# Patient Record
Sex: FEMALE | Race: BLACK OR AFRICAN AMERICAN | NOT HISPANIC OR LATINO | Employment: UNEMPLOYED | URBAN - METROPOLITAN AREA
[De-identification: names, ages, dates, MRNs, and addresses within clinical notes are randomized per-mention and may not be internally consistent; named-entity substitution may affect disease eponyms.]

---

## 2020-08-05 ENCOUNTER — PROCEDURE VISIT (OUTPATIENT)
Dept: SLEEP MEDICINE | Facility: HOSPITAL | Age: 40
End: 2020-08-05
Attending: INTERNAL MEDICINE
Payer: MEDICAID

## 2020-08-05 DIAGNOSIS — J45.40 MODERATE PERSISTENT ASTHMA: ICD-10-CM

## 2020-08-05 DIAGNOSIS — R53.83 FATIGUE: ICD-10-CM

## 2020-08-05 DIAGNOSIS — R06.02 SHORTNESS OF BREATH: ICD-10-CM

## 2020-08-05 DIAGNOSIS — J45.40 MODERATE PERSISTENT ASTHMA: Primary | ICD-10-CM

## 2020-08-05 DIAGNOSIS — E66.8 OTHER OBESITY: ICD-10-CM

## 2020-08-05 PROCEDURE — 95810 POLYSOM 6/> YRS 4/> PARAM: CPT

## 2020-08-07 DIAGNOSIS — J45.40 MODERATE PERSISTENT ASTHMA: Primary | ICD-10-CM

## 2020-08-07 DIAGNOSIS — E66.8 OTHER OBESITY: ICD-10-CM

## 2020-08-07 DIAGNOSIS — R53.83 FATIGUE: ICD-10-CM

## 2020-08-07 DIAGNOSIS — R06.02 SHORTNESS OF BREATH: ICD-10-CM

## 2020-09-10 ENCOUNTER — PROCEDURE VISIT (OUTPATIENT)
Dept: SLEEP MEDICINE | Facility: HOSPITAL | Age: 40
End: 2020-09-10
Attending: INTERNAL MEDICINE
Payer: MEDICAID

## 2020-09-10 DIAGNOSIS — R06.02 SHORTNESS OF BREATH: ICD-10-CM

## 2020-09-10 DIAGNOSIS — R53.83 FATIGUE: ICD-10-CM

## 2020-09-10 DIAGNOSIS — J45.40 MODERATE PERSISTENT ASTHMA: ICD-10-CM

## 2020-09-10 DIAGNOSIS — E66.8 OTHER OBESITY: ICD-10-CM

## 2020-09-10 PROCEDURE — 95811 POLYSOM 6/>YRS CPAP 4/> PARM: CPT

## 2021-07-11 ENCOUNTER — HOSPITAL ENCOUNTER (OUTPATIENT)
Facility: HOSPITAL | Age: 41
LOS: 1 days | Discharge: HOME | End: 2021-07-11
Attending: EMERGENCY MEDICINE | Admitting: INTERNAL MEDICINE
Payer: MEDICAID

## 2021-07-11 VITALS
WEIGHT: 293 LBS | RESPIRATION RATE: 18 BRPM | BODY MASS INDEX: 41.02 KG/M2 | OXYGEN SATURATION: 97 % | DIASTOLIC BLOOD PRESSURE: 88 MMHG | HEART RATE: 70 BPM | HEIGHT: 71 IN | TEMPERATURE: 98 F | SYSTOLIC BLOOD PRESSURE: 153 MMHG

## 2021-07-11 DIAGNOSIS — I21.4 NSTEMI (NON-ST ELEVATED MYOCARDIAL INFARCTION): ICD-10-CM

## 2021-07-11 DIAGNOSIS — F17.210 TOBACCO SMOKER, 1 PACK OF CIGARETTES OR LESS PER DAY: ICD-10-CM

## 2021-07-11 DIAGNOSIS — R07.9 CHEST PAIN: ICD-10-CM

## 2021-07-11 DIAGNOSIS — G47.33 OSA (OBSTRUCTIVE SLEEP APNEA): ICD-10-CM

## 2021-07-11 DIAGNOSIS — E66.01 SEVERE OBESITY (BMI >= 40): ICD-10-CM

## 2021-07-11 DIAGNOSIS — I10 HTN (HYPERTENSION): ICD-10-CM

## 2021-07-11 DIAGNOSIS — R79.89 ELEVATED TROPONIN: Primary | ICD-10-CM

## 2021-07-11 DIAGNOSIS — J45.909 ASTHMA: ICD-10-CM

## 2021-07-11 DIAGNOSIS — I10 ESSENTIAL HYPERTENSION: ICD-10-CM

## 2021-07-11 DIAGNOSIS — I24.9 ACUTE CORONARY SYNDROME: ICD-10-CM

## 2021-07-11 DIAGNOSIS — R07.89 ANTERIOR CHEST WALL PAIN: ICD-10-CM

## 2021-07-11 LAB
ALBUMIN SERPL BCP-MCNC: 3.7 G/DL (ref 3.5–5)
ALBUMIN/GLOB SERPL: 0.9 {RATIO}
ALP SERPL-CCNC: 58 U/L (ref 37–98)
ALT SERPL W P-5'-P-CCNC: 25 U/L (ref 13–56)
ANION GAP SERPL CALCULATED.3IONS-SCNC: 12 MMOL/L (ref 7–16)
AORTIC ROOT ANNULUS: 2.5 CM
AORTIC VALVE CUSP SEPERATION: 2.06 CM
APTT PPP: 157.5 SECONDS (ref 25.2–37.3)
APTT PPP: 29.3 SECONDS (ref 25.2–37.3)
AST SERPL W P-5'-P-CCNC: 17 U/L (ref 15–37)
AV INDEX (PROSTH): 0.84
AV MEAN GRADIENT: 5 MMHG
AV PEAK GRADIENT: 8 MMHG
AV VALVE AREA: 2.91 CM2
AV VELOCITY RATIO: 0.71
BASOPHILS # BLD AUTO: 0.02 K/UL (ref 0–0.2)
BASOPHILS NFR BLD AUTO: 0.2 % (ref 0–1)
BILIRUB SERPL-MCNC: 0.5 MG/DL (ref 0–1.2)
BSA FOR ECHO PROCEDURE: 2.85 M2
BUN SERPL-MCNC: 9 MG/DL (ref 7–18)
BUN/CREAT SERPL: 10 (ref 6–20)
CALCIUM SERPL-MCNC: 9.1 MG/DL (ref 8.5–10.1)
CHLORIDE SERPL-SCNC: 107 MMOL/L (ref 98–107)
CO2 SERPL-SCNC: 28 MMOL/L (ref 21–32)
CREAT SERPL-MCNC: 0.93 MG/DL (ref 0.55–1.02)
CV ECHO LV RWT: 0.55 CM
D DIMER PPP FEU-MCNC: 0.34 ΜG/ML (ref 0–0.47)
DIFFERENTIAL METHOD BLD: ABNORMAL
DOP CALC AO PEAK VEL: 1.4 M/S
DOP CALC AO VTI: 25 CM
DOP CALC LVOT AREA: 3.5 CM2
DOP CALC LVOT DIAMETER: 2.1 CM
DOP CALC LVOT PEAK VEL: 1 M/S
DOP CALC LVOT STROKE VOLUME: 72.7 CM3
DOP CALCLVOT PEAK VEL VTI: 21 CM
E WAVE DECELERATION TIME: 204 MSEC
ECHO EF ESTIMATED: 65 %
ECHO LV POSTERIOR WALL: 1.44 CM (ref 0.6–1.1)
EJECTION FRACTION: 65 %
EOSINOPHIL # BLD AUTO: 0.07 K/UL (ref 0–0.5)
EOSINOPHIL NFR BLD AUTO: 0.8 % (ref 1–4)
ERYTHROCYTE [DISTWIDTH] IN BLOOD BY AUTOMATED COUNT: 19.3 % (ref 11.5–14.5)
EST. AVERAGE GLUCOSE BLD GHB EST-MCNC: 107 MG/DL
FRACTIONAL SHORTENING: 40 % (ref 28–44)
GLOBULIN SER-MCNC: 3.9 G/DL (ref 2–4)
GLUCOSE SERPL-MCNC: 119 MG/DL (ref 74–106)
HBA1C MFR BLD HPLC: 5.8 % (ref 4.5–6.6)
HCG SERUM QUALITATIVE: NEGATIVE
HCT VFR BLD AUTO: 37 % (ref 38–47)
HGB BLD-MCNC: 10.9 G/DL (ref 12–16)
IMM GRANULOCYTES # BLD AUTO: 0.03 K/UL (ref 0–0.04)
IMM GRANULOCYTES NFR BLD: 0.4 % (ref 0–0.4)
INR BLD: 0.91 (ref 0.9–1.1)
INTERVENTRICULAR SEPTUM: 1.48 CM (ref 0.6–1.1)
IVC OSTIUM: 2.1 CM
LEFT ATRIUM SIZE: 3.5 CM
LEFT INTERNAL DIMENSION IN SYSTOLE: 3.15 CM (ref 2.1–4)
LEFT VENTRICLE MASS INDEX: 124 G/M2
LEFT VENTRICULAR INTERNAL DIMENSION IN DIASTOLE: 5.25 CM (ref 3.5–6)
LEFT VENTRICULAR MASS: 333.93 G
LVOT MG: 2 MMHG
LYMPHOCYTES # BLD AUTO: 1.93 K/UL (ref 1–4.8)
LYMPHOCYTES NFR BLD AUTO: 23.4 % (ref 27–41)
MCH RBC QN AUTO: 22.6 PG (ref 27–31)
MCHC RBC AUTO-ENTMCNC: 29.5 G/DL (ref 32–36)
MCV RBC AUTO: 76.6 FL (ref 80–96)
MONOCYTES # BLD AUTO: 0.58 K/UL (ref 0–0.8)
MONOCYTES NFR BLD AUTO: 7 % (ref 2–6)
MPC BLD CALC-MCNC: 11.2 FL (ref 9.4–12.4)
MV PEAK E VEL: 0.92 M/S
NEUTROPHILS # BLD AUTO: 5.61 K/UL (ref 1.8–7.7)
NEUTROPHILS NFR BLD AUTO: 68.2 % (ref 53–65)
NRBC # BLD AUTO: 0 X10E3/UL
NRBC, AUTO (.00): 0 %
NT-PROBNP SERPL-MCNC: 30 PG/ML (ref 1–125)
OVALOCYTES BLD QL SMEAR: ABNORMAL
PISA TR MAX VEL: 2.6 M/S
PLATELET # BLD AUTO: 297 K/UL (ref 150–400)
PLATELET MORPHOLOGY: ABNORMAL
POLYCHROMASIA BLD QL SMEAR: ABNORMAL
POTASSIUM SERPL-SCNC: 4 MMOL/L (ref 3.5–5.1)
PROT SERPL-MCNC: 7.6 G/DL (ref 6.4–8.2)
PROTHROMBIN TIME: 12.3 SECONDS (ref 11.7–14.7)
RA MAJOR: 4.5 CM
RA PRESSURE: 3 MMHG
RBC # BLD AUTO: 4.83 M/UL (ref 4.2–5.4)
RIGHT VENTRICULAR END-DIASTOLIC DIMENSION: 3.6 CM
SODIUM SERPL-SCNC: 143 MMOL/L (ref 136–145)
TR MAX PG: 27 MMHG
TRICUSPID ANNULAR PLANE SYSTOLIC EXCURSION: 2.7 CM
TROPONIN I SERPL-MCNC: 0.06 NG/ML
TROPONIN I SERPL-MCNC: 0.11 NG/ML
TROPONIN I SERPL-MCNC: 0.21 NG/ML
TV REST PULMONARY ARTERY PRESSURE: 30 MMHG
WBC # BLD AUTO: 8.24 K/UL (ref 4.5–11)

## 2021-07-11 PROCEDURE — 99285 EMERGENCY DEPT VISIT HI MDM: CPT | Mod: ,,, | Performed by: EMERGENCY MEDICINE

## 2021-07-11 PROCEDURE — 85730 THROMBOPLASTIN TIME PARTIAL: CPT | Performed by: EMERGENCY MEDICINE

## 2021-07-11 PROCEDURE — 93005 ELECTROCARDIOGRAM TRACING: CPT | Mod: 59

## 2021-07-11 PROCEDURE — 63600175 PHARM REV CODE 636 W HCPCS: Performed by: INTERNAL MEDICINE

## 2021-07-11 PROCEDURE — 25000003 PHARM REV CODE 250: Performed by: INTERNAL MEDICINE

## 2021-07-11 PROCEDURE — 25000242 PHARM REV CODE 250 ALT 637 W/ HCPCS: Performed by: HOSPITALIST

## 2021-07-11 PROCEDURE — 99285 PR EMERGENCY DEPT VISIT,LEVEL V: ICD-10-PCS | Mod: ,,, | Performed by: EMERGENCY MEDICINE

## 2021-07-11 PROCEDURE — 96376 TX/PRO/DX INJ SAME DRUG ADON: CPT | Mod: 59 | Performed by: EMERGENCY MEDICINE

## 2021-07-11 PROCEDURE — 99285 EMERGENCY DEPT VISIT HI MDM: CPT | Mod: 25 | Performed by: EMERGENCY MEDICINE

## 2021-07-11 PROCEDURE — 25000003 PHARM REV CODE 250: Performed by: EMERGENCY MEDICINE

## 2021-07-11 PROCEDURE — 85025 COMPLETE CBC W/AUTO DIFF WBC: CPT | Performed by: EMERGENCY MEDICINE

## 2021-07-11 PROCEDURE — 93454 CORONARY ARTERY ANGIO S&I: CPT | Mod: 26,,, | Performed by: INTERNAL MEDICINE

## 2021-07-11 PROCEDURE — 96374 THER/PROPH/DIAG INJ IV PUSH: CPT | Performed by: EMERGENCY MEDICINE

## 2021-07-11 PROCEDURE — C1887 CATHETER, GUIDING: HCPCS | Performed by: INTERNAL MEDICINE

## 2021-07-11 PROCEDURE — 99152 MOD SED SAME PHYS/QHP 5/>YRS: CPT | Performed by: INTERNAL MEDICINE

## 2021-07-11 PROCEDURE — 84703 CHORIONIC GONADOTROPIN ASSAY: CPT | Performed by: EMERGENCY MEDICINE

## 2021-07-11 PROCEDURE — G0378 HOSPITAL OBSERVATION PER HR: HCPCS

## 2021-07-11 PROCEDURE — 63600175 PHARM REV CODE 636 W HCPCS: Performed by: EMERGENCY MEDICINE

## 2021-07-11 PROCEDURE — 63600175 PHARM REV CODE 636 W HCPCS

## 2021-07-11 PROCEDURE — 27100168 OPTIME MED/SURG SUP & DEVICES NON-STERILE SUPPLY: Performed by: INTERNAL MEDICINE

## 2021-07-11 PROCEDURE — 83880 ASSAY OF NATRIURETIC PEPTIDE: CPT | Performed by: INTERNAL MEDICINE

## 2021-07-11 PROCEDURE — 80053 COMPREHEN METABOLIC PANEL: CPT | Performed by: EMERGENCY MEDICINE

## 2021-07-11 PROCEDURE — 25500020 PHARM REV CODE 255: Performed by: INTERNAL MEDICINE

## 2021-07-11 PROCEDURE — 99204 PR OFFICE/OUTPT VISIT, NEW, LEVL IV, 45-59 MIN: ICD-10-PCS | Mod: 25,,, | Performed by: INTERNAL MEDICINE

## 2021-07-11 PROCEDURE — 36415 COLL VENOUS BLD VENIPUNCTURE: CPT | Performed by: INTERNAL MEDICINE

## 2021-07-11 PROCEDURE — C1769 GUIDE WIRE: HCPCS | Performed by: INTERNAL MEDICINE

## 2021-07-11 PROCEDURE — 25000242 PHARM REV CODE 250 ALT 637 W/ HCPCS: Performed by: EMERGENCY MEDICINE

## 2021-07-11 PROCEDURE — C1894 INTRO/SHEATH, NON-LASER: HCPCS | Performed by: INTERNAL MEDICINE

## 2021-07-11 PROCEDURE — 93010 ELECTROCARDIOGRAM REPORT: CPT | Mod: 59,,, | Performed by: INTERNAL MEDICINE

## 2021-07-11 PROCEDURE — 27800903 OPTIME MED/SURG SUP & DEVICES OTHER IMPLANTS: Performed by: INTERNAL MEDICINE

## 2021-07-11 PROCEDURE — 84484 ASSAY OF TROPONIN QUANT: CPT | Performed by: EMERGENCY MEDICINE

## 2021-07-11 PROCEDURE — 36415 COLL VENOUS BLD VENIPUNCTURE: CPT | Performed by: EMERGENCY MEDICINE

## 2021-07-11 PROCEDURE — 83036 HEMOGLOBIN GLYCOSYLATED A1C: CPT | Performed by: INTERNAL MEDICINE

## 2021-07-11 PROCEDURE — 27201423 OPTIME MED/SURG SUP & DEVICES STERILE SUPPLY: Performed by: INTERNAL MEDICINE

## 2021-07-11 PROCEDURE — 93010 ELECTROCARDIOGRAM REPORT: CPT | Mod: 76,59,, | Performed by: INTERNAL MEDICINE

## 2021-07-11 PROCEDURE — 85378 FIBRIN DEGRADE SEMIQUANT: CPT | Performed by: EMERGENCY MEDICINE

## 2021-07-11 PROCEDURE — 85610 PROTHROMBIN TIME: CPT | Performed by: EMERGENCY MEDICINE

## 2021-07-11 PROCEDURE — 93010 EKG 12-LEAD: ICD-10-PCS | Mod: 59,,, | Performed by: INTERNAL MEDICINE

## 2021-07-11 PROCEDURE — 99220 PR INITIAL OBSERVATION CARE,LEVL III: ICD-10-PCS | Mod: GC,,, | Performed by: INTERNAL MEDICINE

## 2021-07-11 PROCEDURE — 99220 PR INITIAL OBSERVATION CARE,LEVL III: CPT | Mod: GC,,, | Performed by: INTERNAL MEDICINE

## 2021-07-11 PROCEDURE — 99204 OFFICE O/P NEW MOD 45 MIN: CPT | Mod: 25,,, | Performed by: INTERNAL MEDICINE

## 2021-07-11 PROCEDURE — 27000080 OPTIME MED/SURG SUP & DEVICES GENERAL CLASSIFICATION: Performed by: INTERNAL MEDICINE

## 2021-07-11 PROCEDURE — 96375 TX/PRO/DX INJ NEW DRUG ADDON: CPT | Mod: 59 | Performed by: EMERGENCY MEDICINE

## 2021-07-11 PROCEDURE — 94761 N-INVAS EAR/PLS OXIMETRY MLT: CPT

## 2021-07-11 PROCEDURE — 93454 CORONARY ARTERY ANGIO S&I: CPT | Performed by: INTERNAL MEDICINE

## 2021-07-11 PROCEDURE — 84484 ASSAY OF TROPONIN QUANT: CPT | Performed by: INTERNAL MEDICINE

## 2021-07-11 PROCEDURE — 93454 PR CATH PLACE/CORONARY ANGIO, IMG SUPER/INTERP: ICD-10-PCS | Mod: 26,,, | Performed by: INTERNAL MEDICINE

## 2021-07-11 PROCEDURE — 85730 THROMBOPLASTIN TIME PARTIAL: CPT | Performed by: INTERNAL MEDICINE

## 2021-07-11 PROCEDURE — 27000221 HC OXYGEN, UP TO 24 HOURS

## 2021-07-11 RX ORDER — SODIUM CHLORIDE 450 MG/100ML
INJECTION, SOLUTION INTRAVENOUS
Status: DISCONTINUED | OUTPATIENT
Start: 2021-07-11 | End: 2021-07-11

## 2021-07-11 RX ORDER — ORPHENADRINE CITRATE 30 MG/ML
60 INJECTION INTRAMUSCULAR; INTRAVENOUS
Status: COMPLETED | OUTPATIENT
Start: 2021-07-11 | End: 2021-07-11

## 2021-07-11 RX ORDER — AMLODIPINE BESYLATE 5 MG/1
5 TABLET ORAL DAILY
Qty: 30 TABLET | Refills: 11 | Status: SHIPPED | OUTPATIENT
Start: 2021-07-12 | End: 2022-07-12

## 2021-07-11 RX ORDER — LIDOCAINE HYDROCHLORIDE 10 MG/ML
INJECTION INFILTRATION; PERINEURAL
Status: DISCONTINUED | OUTPATIENT
Start: 2021-07-11 | End: 2021-07-11 | Stop reason: HOSPADM

## 2021-07-11 RX ORDER — ONDANSETRON 2 MG/ML
4 INJECTION INTRAMUSCULAR; INTRAVENOUS
Status: COMPLETED | OUTPATIENT
Start: 2021-07-11 | End: 2021-07-11

## 2021-07-11 RX ORDER — METOPROLOL TARTRATE 25 MG/1
12.5 TABLET ORAL 2 TIMES DAILY
Status: DISCONTINUED | OUTPATIENT
Start: 2021-07-11 | End: 2021-07-11

## 2021-07-11 RX ORDER — IPRATROPIUM BROMIDE AND ALBUTEROL SULFATE 2.5; .5 MG/3ML; MG/3ML
3 SOLUTION RESPIRATORY (INHALATION)
Status: COMPLETED | OUTPATIENT
Start: 2021-07-11 | End: 2021-07-11

## 2021-07-11 RX ORDER — ASPIRIN 325 MG
325 TABLET ORAL
Status: COMPLETED | OUTPATIENT
Start: 2021-07-11 | End: 2021-07-11

## 2021-07-11 RX ORDER — CARVEDILOL 6.25 MG/1
6.25 TABLET ORAL 2 TIMES DAILY
Status: DISCONTINUED | OUTPATIENT
Start: 2021-07-11 | End: 2021-07-11

## 2021-07-11 RX ORDER — ASPIRIN 325 MG
325 TABLET ORAL
Status: DISCONTINUED | OUTPATIENT
Start: 2021-07-11 | End: 2021-07-11

## 2021-07-11 RX ORDER — MORPHINE SULFATE 4 MG/ML
4 INJECTION, SOLUTION INTRAMUSCULAR; INTRAVENOUS
Status: COMPLETED | OUTPATIENT
Start: 2021-07-11 | End: 2021-07-11

## 2021-07-11 RX ORDER — ATORVASTATIN CALCIUM 80 MG/1
80 TABLET, FILM COATED ORAL DAILY
Status: DISCONTINUED | OUTPATIENT
Start: 2021-07-11 | End: 2021-07-11

## 2021-07-11 RX ORDER — NITROGLYCERIN 0.4 MG/1
0.4 TABLET SUBLINGUAL EVERY 5 MIN PRN
Status: DISCONTINUED | OUTPATIENT
Start: 2021-07-11 | End: 2021-07-11

## 2021-07-11 RX ORDER — ONDANSETRON 4 MG/1
8 TABLET, ORALLY DISINTEGRATING ORAL EVERY 8 HOURS PRN
Status: DISCONTINUED | OUTPATIENT
Start: 2021-07-11 | End: 2021-07-11 | Stop reason: HOSPADM

## 2021-07-11 RX ORDER — MORPHINE SULFATE 2 MG/ML
2 INJECTION, SOLUTION INTRAMUSCULAR; INTRAVENOUS
Status: COMPLETED | OUTPATIENT
Start: 2021-07-11 | End: 2021-07-11

## 2021-07-11 RX ORDER — HEPARIN SOD,PORCINE/0.9 % NACL 1000/500ML
INTRAVENOUS SOLUTION INTRAVENOUS
Status: DISCONTINUED | OUTPATIENT
Start: 2021-07-11 | End: 2021-07-11 | Stop reason: HOSPADM

## 2021-07-11 RX ORDER — NAPROXEN 500 MG/1
500 TABLET ORAL 2 TIMES DAILY PRN
Qty: 30 TABLET | Refills: 0 | Status: SHIPPED | OUTPATIENT
Start: 2021-07-11

## 2021-07-11 RX ORDER — NAPROXEN 250 MG/1
500 TABLET ORAL 2 TIMES DAILY WITH MEALS
Status: DISCONTINUED | OUTPATIENT
Start: 2021-07-11 | End: 2021-07-11 | Stop reason: HOSPADM

## 2021-07-11 RX ORDER — ACETAMINOPHEN 325 MG/1
650 TABLET ORAL EVERY 4 HOURS PRN
Status: DISCONTINUED | OUTPATIENT
Start: 2021-07-11 | End: 2021-07-11 | Stop reason: HOSPADM

## 2021-07-11 RX ORDER — FENTANYL CITRATE 50 UG/ML
INJECTION, SOLUTION INTRAMUSCULAR; INTRAVENOUS
Status: DISCONTINUED | OUTPATIENT
Start: 2021-07-11 | End: 2021-07-11 | Stop reason: HOSPADM

## 2021-07-11 RX ORDER — ACETAMINOPHEN 325 MG/1
650 TABLET ORAL EVERY 4 HOURS PRN
Status: DISCONTINUED | OUTPATIENT
Start: 2021-07-11 | End: 2021-07-11

## 2021-07-11 RX ORDER — NITROGLYCERIN 0.4 MG/1
0.4 TABLET SUBLINGUAL EVERY 5 MIN PRN
Status: COMPLETED | OUTPATIENT
Start: 2021-07-11 | End: 2021-07-11

## 2021-07-11 RX ORDER — HEPARIN SODIUM,PORCINE/D5W 25000/250
0-40 INTRAVENOUS SOLUTION INTRAVENOUS CONTINUOUS
Status: DISCONTINUED | OUTPATIENT
Start: 2021-07-11 | End: 2021-07-11

## 2021-07-11 RX ORDER — HEPARIN SODIUM 5000 [USP'U]/ML
INJECTION, SOLUTION INTRAVENOUS; SUBCUTANEOUS
Status: COMPLETED
Start: 2021-07-11 | End: 2021-07-11

## 2021-07-11 RX ORDER — ENOXAPARIN SODIUM 100 MG/ML
120 INJECTION SUBCUTANEOUS
Status: DISCONTINUED | OUTPATIENT
Start: 2021-07-11 | End: 2021-07-11

## 2021-07-11 RX ORDER — SODIUM CHLORIDE 0.9 % (FLUSH) 0.9 %
10 SYRINGE (ML) INJECTION
Status: DISCONTINUED | OUTPATIENT
Start: 2021-07-11 | End: 2021-07-11 | Stop reason: HOSPADM

## 2021-07-11 RX ORDER — AMLODIPINE BESYLATE 5 MG/1
5 TABLET ORAL DAILY
Status: DISCONTINUED | OUTPATIENT
Start: 2021-07-11 | End: 2021-07-11 | Stop reason: HOSPADM

## 2021-07-11 RX ORDER — NAPROXEN SODIUM 220 MG/1
81 TABLET, FILM COATED ORAL DAILY
Status: DISCONTINUED | OUTPATIENT
Start: 2021-07-11 | End: 2021-07-11 | Stop reason: HOSPADM

## 2021-07-11 RX ORDER — MIDAZOLAM HYDROCHLORIDE 1 MG/ML
INJECTION INTRAMUSCULAR; INTRAVENOUS
Status: DISCONTINUED | OUTPATIENT
Start: 2021-07-11 | End: 2021-07-11 | Stop reason: HOSPADM

## 2021-07-11 RX ORDER — MORPHINE SULFATE 2 MG/ML
2 INJECTION, SOLUTION INTRAMUSCULAR; INTRAVENOUS EVERY 4 HOURS PRN
Status: DISCONTINUED | OUTPATIENT
Start: 2021-07-11 | End: 2021-07-11

## 2021-07-11 RX ADMIN — NITROGLYCERIN 0.4 MG: 0.4 TABLET SUBLINGUAL at 02:07

## 2021-07-11 RX ADMIN — AMLODIPINE BESYLATE 5 MG: 5 TABLET ORAL at 12:07

## 2021-07-11 RX ADMIN — MORPHINE SULFATE 4 MG: 4 INJECTION INTRAVENOUS at 03:07

## 2021-07-11 RX ADMIN — ORPHENADRINE CITRATE 60 MG: 60 INJECTION INTRAMUSCULAR; INTRAVENOUS at 01:07

## 2021-07-11 RX ADMIN — ASPIRIN 81 MG: 81 TABLET, CHEWABLE ORAL at 09:07

## 2021-07-11 RX ADMIN — ONDANSETRON 4 MG: 2 INJECTION INTRAMUSCULAR; INTRAVENOUS at 01:07

## 2021-07-11 RX ADMIN — ASPIRIN 325 MG ORAL TABLET 325 MG: 325 PILL ORAL at 01:07

## 2021-07-11 RX ADMIN — IPRATROPIUM BROMIDE AND ALBUTEROL SULFATE 3 ML: .5; 3 SOLUTION RESPIRATORY (INHALATION) at 01:07

## 2021-07-11 RX ADMIN — ATORVASTATIN CALCIUM 80 MG: 80 TABLET, FILM COATED ORAL at 06:07

## 2021-07-11 RX ADMIN — NITROGLYCERIN 0.4 MG: 0.4 TABLET SUBLINGUAL at 09:07

## 2021-07-11 RX ADMIN — HEPARIN SODIUM 4000 UNITS: 5000 INJECTION INTRAVENOUS; SUBCUTANEOUS at 03:07

## 2021-07-11 RX ADMIN — HEPARIN SODIUM 12 UNITS/KG/HR: 10000 INJECTION, SOLUTION INTRAVENOUS at 03:07

## 2021-07-11 RX ADMIN — PERFLUTREN 1.5 ML: 6.52 INJECTION, SUSPENSION INTRAVENOUS at 06:07

## 2021-07-11 RX ADMIN — METOPROLOL TARTRATE 12.5 MG: 25 TABLET, FILM COATED ORAL at 04:07

## 2021-07-11 RX ADMIN — MORPHINE SULFATE 4 MG: 4 INJECTION INTRAVENOUS at 01:07

## 2021-07-11 RX ADMIN — MORPHINE SULFATE 2 MG: 2 INJECTION, SOLUTION INTRAMUSCULAR; INTRAVENOUS at 04:07

## 2021-07-19 ENCOUNTER — HOSPITAL ENCOUNTER (EMERGENCY)
Facility: HOSPITAL | Age: 41
Discharge: HOME | End: 2021-07-20
Attending: EMERGENCY MEDICINE
Payer: MEDICAID

## 2021-07-19 DIAGNOSIS — R07.9 CHEST PAIN: ICD-10-CM

## 2021-07-19 DIAGNOSIS — J45.909 ASTHMA: ICD-10-CM

## 2021-07-19 DIAGNOSIS — F17.210 TOBACCO SMOKER, 1 PACK OF CIGARETTES OR LESS PER DAY: ICD-10-CM

## 2021-07-19 DIAGNOSIS — G47.33 OSA (OBSTRUCTIVE SLEEP APNEA): ICD-10-CM

## 2021-07-19 DIAGNOSIS — R79.89 ELEVATED TROPONIN: ICD-10-CM

## 2021-07-19 DIAGNOSIS — R07.89 ANTERIOR CHEST WALL PAIN: ICD-10-CM

## 2021-07-19 DIAGNOSIS — I10 HTN (HYPERTENSION): ICD-10-CM

## 2021-07-19 DIAGNOSIS — R06.02 SHORTNESS OF BREATH: ICD-10-CM

## 2021-07-19 DIAGNOSIS — J45.41 MODERATE PERSISTENT ASTHMA WITH EXACERBATION: Primary | ICD-10-CM

## 2021-07-19 DIAGNOSIS — E66.01 SEVERE OBESITY (BMI >= 40): ICD-10-CM

## 2021-07-19 PROCEDURE — 99283 PR EMERGENCY DEPT VISIT,LEVEL III: ICD-10-PCS | Mod: ,,, | Performed by: EMERGENCY MEDICINE

## 2021-07-19 PROCEDURE — 99283 EMERGENCY DEPT VISIT LOW MDM: CPT | Mod: ,,, | Performed by: EMERGENCY MEDICINE

## 2021-07-19 PROCEDURE — 96365 THER/PROPH/DIAG IV INF INIT: CPT

## 2021-07-19 PROCEDURE — 96374 THER/PROPH/DIAG INJ IV PUSH: CPT | Mod: 59

## 2021-07-19 PROCEDURE — 99285 EMERGENCY DEPT VISIT HI MDM: CPT | Mod: 25

## 2021-07-19 RX ORDER — BUDESONIDE AND FORMOTEROL FUMARATE DIHYDRATE 80; 4.5 UG/1; UG/1
2 AEROSOL RESPIRATORY (INHALATION)
COMMUNITY

## 2021-07-20 VITALS
DIASTOLIC BLOOD PRESSURE: 90 MMHG | BODY MASS INDEX: 41.95 KG/M2 | TEMPERATURE: 100 F | RESPIRATION RATE: 15 BRPM | HEIGHT: 70 IN | HEART RATE: 85 BPM | SYSTOLIC BLOOD PRESSURE: 156 MMHG | WEIGHT: 293 LBS | OXYGEN SATURATION: 97 %

## 2021-07-20 LAB
ALBUMIN SERPL BCP-MCNC: 3.3 G/DL (ref 3.5–5)
ALBUMIN/GLOB SERPL: 1 {RATIO}
ALP SERPL-CCNC: 49 U/L (ref 37–98)
ALT SERPL W P-5'-P-CCNC: 22 U/L (ref 13–56)
ANION GAP SERPL CALCULATED.3IONS-SCNC: 12 MMOL/L (ref 7–16)
ANISOCYTOSIS BLD QL SMEAR: ABNORMAL
AST SERPL W P-5'-P-CCNC: 15 U/L (ref 15–37)
BASOPHILS # BLD AUTO: 0.02 K/UL (ref 0–0.2)
BASOPHILS NFR BLD AUTO: 0.4 % (ref 0–1)
BILIRUB SERPL-MCNC: 0.3 MG/DL (ref 0–1.2)
BUN SERPL-MCNC: 8 MG/DL (ref 7–18)
BUN/CREAT SERPL: 8 (ref 6–20)
CALCIUM SERPL-MCNC: 8.9 MG/DL (ref 8.5–10.1)
CHLORIDE SERPL-SCNC: 109 MMOL/L (ref 98–107)
CO2 SERPL-SCNC: 28 MMOL/L (ref 21–32)
CREAT SERPL-MCNC: 0.98 MG/DL (ref 0.55–1.02)
DIFFERENTIAL METHOD BLD: ABNORMAL
EOSINOPHIL # BLD AUTO: 0.15 K/UL (ref 0–0.5)
EOSINOPHIL NFR BLD AUTO: 2.6 % (ref 1–4)
EOSINOPHIL NFR BLD MANUAL: 3 % (ref 1–4)
ERYTHROCYTE [DISTWIDTH] IN BLOOD BY AUTOMATED COUNT: 19.5 % (ref 11.5–14.5)
GLOBULIN SER-MCNC: 3.4 G/DL (ref 2–4)
GLUCOSE SERPL-MCNC: 132 MG/DL (ref 74–106)
HCT VFR BLD AUTO: 32.7 % (ref 38–47)
HGB BLD-MCNC: 9.7 G/DL (ref 12–16)
HYPOCHROMIA BLD QL SMEAR: ABNORMAL
IMM GRANULOCYTES # BLD AUTO: 0.01 K/UL (ref 0–0.04)
IMM GRANULOCYTES NFR BLD: 0.2 % (ref 0–0.4)
LYMPHOCYTES # BLD AUTO: 1.5 K/UL (ref 1–4.8)
LYMPHOCYTES NFR BLD AUTO: 26.4 % (ref 27–41)
LYMPHOCYTES NFR BLD MANUAL: 23 % (ref 27–41)
MCH RBC QN AUTO: 22.3 PG (ref 27–31)
MCHC RBC AUTO-ENTMCNC: 29.7 G/DL (ref 32–36)
MCV RBC AUTO: 75.2 FL (ref 80–96)
MONOCYTES # BLD AUTO: 0.74 K/UL (ref 0–0.8)
MONOCYTES NFR BLD AUTO: 13 % (ref 2–6)
MONOCYTES NFR BLD MANUAL: 11 % (ref 2–6)
MPC BLD CALC-MCNC: 9.6 FL (ref 9.4–12.4)
NEUTROPHILS # BLD AUTO: 3.27 K/UL (ref 1.8–7.7)
NEUTROPHILS NFR BLD AUTO: 57.4 % (ref 53–65)
NEUTS SEG NFR BLD MANUAL: 63 % (ref 50–62)
NRBC # BLD AUTO: 0 X10E3/UL
NRBC, AUTO (.00): 0 %
NT-PROBNP SERPL-MCNC: 17 PG/ML (ref 1–125)
OVALOCYTES BLD QL SMEAR: ABNORMAL
PLATELET # BLD AUTO: 235 K/UL (ref 150–400)
PLATELET MORPHOLOGY: ABNORMAL
POTASSIUM SERPL-SCNC: 3.7 MMOL/L (ref 3.5–5.1)
PROT SERPL-MCNC: 6.7 G/DL (ref 6.4–8.2)
RBC # BLD AUTO: 4.35 M/UL (ref 4.2–5.4)
SODIUM SERPL-SCNC: 145 MMOL/L (ref 136–145)
TROPONIN I SERPL-MCNC: <0.017 NG/ML
WBC # BLD AUTO: 5.69 K/UL (ref 4.5–11)

## 2021-07-20 PROCEDURE — 84484 ASSAY OF TROPONIN QUANT: CPT | Performed by: EMERGENCY MEDICINE

## 2021-07-20 PROCEDURE — 63600175 PHARM REV CODE 636 W HCPCS: Performed by: EMERGENCY MEDICINE

## 2021-07-20 PROCEDURE — 83880 ASSAY OF NATRIURETIC PEPTIDE: CPT | Performed by: EMERGENCY MEDICINE

## 2021-07-20 PROCEDURE — 85025 COMPLETE CBC W/AUTO DIFF WBC: CPT | Performed by: EMERGENCY MEDICINE

## 2021-07-20 PROCEDURE — 80053 COMPREHEN METABOLIC PANEL: CPT | Performed by: EMERGENCY MEDICINE

## 2021-07-20 PROCEDURE — 25000242 PHARM REV CODE 250 ALT 637 W/ HCPCS: Performed by: EMERGENCY MEDICINE

## 2021-07-20 PROCEDURE — 36415 COLL VENOUS BLD VENIPUNCTURE: CPT | Performed by: EMERGENCY MEDICINE

## 2021-07-20 RX ORDER — PREDNISONE 20 MG/1
40 TABLET ORAL DAILY
Qty: 10 TABLET | Refills: 0 | Status: SHIPPED | OUTPATIENT
Start: 2021-07-20 | End: 2021-07-25

## 2021-07-20 RX ORDER — MAGNESIUM SULFATE HEPTAHYDRATE 40 MG/ML
2 INJECTION, SOLUTION INTRAVENOUS
Status: COMPLETED | OUTPATIENT
Start: 2021-07-20 | End: 2021-07-20

## 2021-07-20 RX ORDER — BUDESONIDE 0.5 MG/2ML
0.5 INHALANT ORAL ONCE
Status: COMPLETED | OUTPATIENT
Start: 2021-07-20 | End: 2021-07-20

## 2021-07-20 RX ORDER — METHYLPREDNISOLONE SOD SUCC 125 MG
125 VIAL (EA) INJECTION
Status: COMPLETED | OUTPATIENT
Start: 2021-07-20 | End: 2021-07-20

## 2021-07-20 RX ORDER — IPRATROPIUM BROMIDE AND ALBUTEROL SULFATE 2.5; .5 MG/3ML; MG/3ML
3 SOLUTION RESPIRATORY (INHALATION)
Status: COMPLETED | OUTPATIENT
Start: 2021-07-20 | End: 2021-07-20

## 2021-07-20 RX ADMIN — MAGNESIUM SULFATE IN WATER 2 G: 40 INJECTION, SOLUTION INTRAVENOUS at 01:07

## 2021-07-20 RX ADMIN — BUDESONIDE 0.5 MG: 0.5 INHALANT RESPIRATORY (INHALATION) at 01:07

## 2021-07-20 RX ADMIN — IPRATROPIUM BROMIDE AND ALBUTEROL SULFATE 3 ML: .5; 3 SOLUTION RESPIRATORY (INHALATION) at 01:07

## 2021-07-20 RX ADMIN — METHYLPREDNISOLONE SODIUM SUCCINATE 125 MG: 125 INJECTION, POWDER, FOR SOLUTION INTRAMUSCULAR; INTRAVENOUS at 01:07

## 2021-12-28 ENCOUNTER — HOSPITAL ENCOUNTER (EMERGENCY)
Facility: HOSPITAL | Age: 41
Discharge: HOME | End: 2021-12-29
Attending: EMERGENCY MEDICINE
Payer: MEDICAID

## 2021-12-28 DIAGNOSIS — J45.909 ASTHMA: ICD-10-CM

## 2021-12-28 DIAGNOSIS — R07.89 ANTERIOR CHEST WALL PAIN: ICD-10-CM

## 2021-12-28 DIAGNOSIS — F17.210 TOBACCO SMOKER, 1 PACK OF CIGARETTES OR LESS PER DAY: ICD-10-CM

## 2021-12-28 DIAGNOSIS — J45.901 EXACERBATION OF ASTHMA, UNSPECIFIED ASTHMA SEVERITY, UNSPECIFIED WHETHER PERSISTENT: Primary | ICD-10-CM

## 2021-12-28 DIAGNOSIS — R79.89 ELEVATED TROPONIN: ICD-10-CM

## 2021-12-28 DIAGNOSIS — I10 HTN (HYPERTENSION): ICD-10-CM

## 2021-12-28 DIAGNOSIS — R06.02 SOB (SHORTNESS OF BREATH): ICD-10-CM

## 2021-12-28 DIAGNOSIS — E66.01 SEVERE OBESITY (BMI >= 40): ICD-10-CM

## 2021-12-28 DIAGNOSIS — R07.9 CHEST PAIN: ICD-10-CM

## 2021-12-28 DIAGNOSIS — G47.33 OSA (OBSTRUCTIVE SLEEP APNEA): ICD-10-CM

## 2021-12-28 DIAGNOSIS — J45.901 EXACERBATION OF ASTHMA: ICD-10-CM

## 2021-12-28 LAB
FLUAV AG UPPER RESP QL IA.RAPID: NEGATIVE
FLUBV AG UPPER RESP QL IA.RAPID: NEGATIVE
SARS-COV+SARS-COV-2 AG RESP QL IA.RAPID: POSITIVE

## 2021-12-28 PROCEDURE — 99283 PR EMERGENCY DEPT VISIT,LEVEL III: ICD-10-PCS | Mod: ,,, | Performed by: REGISTERED NURSE

## 2021-12-28 PROCEDURE — 25000242 PHARM REV CODE 250 ALT 637 W/ HCPCS: Performed by: REGISTERED NURSE

## 2021-12-28 PROCEDURE — 99284 EMERGENCY DEPT VISIT MOD MDM: CPT

## 2021-12-28 PROCEDURE — 99283 EMERGENCY DEPT VISIT LOW MDM: CPT | Mod: ,,, | Performed by: REGISTERED NURSE

## 2021-12-28 PROCEDURE — 87428 SARSCOV & INF VIR A&B AG IA: CPT | Performed by: REGISTERED NURSE

## 2021-12-28 RX ORDER — IPRATROPIUM BROMIDE AND ALBUTEROL SULFATE 2.5; .5 MG/3ML; MG/3ML
3 SOLUTION RESPIRATORY (INHALATION) ONCE
Status: COMPLETED | OUTPATIENT
Start: 2021-12-28 | End: 2021-12-28

## 2021-12-28 RX ORDER — BUDESONIDE 0.5 MG/2ML
0.5 INHALANT ORAL ONCE
Status: COMPLETED | OUTPATIENT
Start: 2021-12-28 | End: 2021-12-29

## 2021-12-28 RX ADMIN — IPRATROPIUM BROMIDE AND ALBUTEROL SULFATE 3 ML: .5; 3 SOLUTION RESPIRATORY (INHALATION) at 11:12

## 2021-12-28 NOTE — Clinical Note
"Padmini Gaspar (Tab) was seen and treated in our emergency department on 12/28/2021.  She may return to work on 01/09/2022.       If you have any questions or concerns, please don't hesitate to call.      Emely Garcia RN    "

## 2021-12-28 NOTE — Clinical Note
"Padmini "Shae Gaspar was seen and treated in our emergency department on 12/28/2021.     COVID-19 is present in our communities across the state. There is limited testing for COVID at this time, so not all patients can be tested. In this situation, your employee meets the following criteria:    Padmini Gaspar has met the criteria for COVID-19 testing based upon symptoms, travel, and/or potential exposure. The test has been completed and is pending results at this time. During this time the employee is not able to work and should be quarantined per the Centers for Disease Control timelines.     If you have any questions or concerns, or if I can be of further assistance, please do not hesitate to contact me.    Sincerely,             Emely Garcia RN"

## 2021-12-28 NOTE — Clinical Note
"Padmini "Shae Gaspar was seen and treated in our emergency department on 12/28/2021.     COVID-19 is present in our communities across the state. There is limited testing for COVID at this time, so not all patients can be tested. In this situation, your employee meets the following criteria:    Padmini Gaspar has met the criteria for COVID-19 testing and has a POSITIVE result. She can return to work once they are asymptomatic for 72 hours without the use of fever reducing medications AND at least ten days from the first positive result.     If you have any questions or concerns, or if I can be of further assistance, please do not hesitate to contact me.    Sincerely,             Emely Garcia RN"

## 2021-12-29 VITALS
RESPIRATION RATE: 17 BRPM | WEIGHT: 293 LBS | TEMPERATURE: 100 F | SYSTOLIC BLOOD PRESSURE: 159 MMHG | DIASTOLIC BLOOD PRESSURE: 99 MMHG | BODY MASS INDEX: 51.94 KG/M2 | OXYGEN SATURATION: 99 % | HEART RATE: 93 BPM

## 2021-12-29 PROBLEM — R06.02 SOB (SHORTNESS OF BREATH): Status: ACTIVE | Noted: 2021-12-29

## 2021-12-29 PROBLEM — J45.901 EXACERBATION OF ASTHMA: Status: ACTIVE | Noted: 2021-12-29

## 2021-12-29 PROCEDURE — 25000003 PHARM REV CODE 250: Performed by: REGISTERED NURSE

## 2021-12-29 PROCEDURE — 25000242 PHARM REV CODE 250 ALT 637 W/ HCPCS: Performed by: REGISTERED NURSE

## 2021-12-29 RX ORDER — ACETAMINOPHEN 500 MG
1000 TABLET ORAL
Status: COMPLETED | OUTPATIENT
Start: 2021-12-29 | End: 2021-12-29

## 2021-12-29 RX ADMIN — ACETAMINOPHEN 1000 MG: 500 TABLET ORAL at 12:12

## 2021-12-29 RX ADMIN — BUDESONIDE 0.5 MG: 0.5 INHALANT RESPIRATORY (INHALATION) at 12:12

## 2021-12-29 NOTE — PROVIDER PROGRESS NOTES - EMERGENCY DEPT.
Encounter Date: 12/28/2021    ED Physician Progress Notes        Physician Note:   Patient in no distress.  VS stable.  No inspiratory or expiratory wheezing noted AP per auscultation.

## 2021-12-29 NOTE — ED PROVIDER NOTES
Encounter Date: 2021  While in triage pt became diaphoretic, with /116, and increased difficulty of breathing.  Placed in CCU and monitors applied.  Pt became less anxious.  BP decreased to 200/114 then 15 minutes later 195/108.  Respirations less labored.     History     Chief Complaint   Patient presents with    Shortness of Breath     40 yo female presents with wheezing and an asthma attack since this morning.  Also reports body aches and subjective fever.    The history is provided by the patient.     Review of patient's allergies indicates:  No Known Allergies  Past Medical History:   Diagnosis Date    Asthma     Hypertension     Sleep apnea      Past Surgical History:   Procedure Laterality Date     SECTION      LEFT HEART CATHETERIZATION Right 2021    Procedure: Left heart cath;  Surgeon: Isaías Darden MD;  Location: [facility] CATH LAB;  Service: Cardiology;  Laterality: Right;     Family History   Problem Relation Age of Onset    Diabetes Mother     Heart disease Mother     Diabetes Father      Social History     Tobacco Use    Smoking status: Light Tobacco Smoker   Substance Use Topics    Drug use: Not Currently     Review of Systems   Constitutional: Positive for diaphoresis and fever (subjective).   HENT: Negative.    Eyes: Negative.    Respiratory: Positive for cough, shortness of breath and wheezing.    Cardiovascular: Negative.    Gastrointestinal: Negative.    Endocrine: Negative.    Genitourinary: Negative.    Musculoskeletal: Positive for myalgias.   Allergic/Immunologic: Negative.    Neurological: Positive for numbness (around mouth and fingertips).   Hematological: Negative.    Psychiatric/Behavioral: Negative.        Physical Exam     Initial Vitals [212]   BP Pulse Resp Temp SpO2   (!) 194/99 96 18 100 °F (37.8 °C) 97 %      MAP       --         Physical Exam    Constitutional: She appears well-developed and well-nourished.   HENT:   Head:  Normocephalic.   Right Ear: External ear normal.   Left Ear: External ear normal.   Nose: Nose normal.   Mouth/Throat: Oropharynx is clear and moist.   Eyes: Conjunctivae and EOM are normal. Pupils are equal, round, and reactive to light. Right eye exhibits no discharge. Left eye exhibits no discharge. No scleral icterus.   Neck:   Normal range of motion.  Cardiovascular: Normal rate, regular rhythm and normal heart sounds.   Pulmonary/Chest: She has wheezes (expiratory posterior lobes). She exhibits tenderness (to palpation).   Abdominal: Abdomen is soft.   Musculoskeletal:         General: No edema. Normal range of motion.      Cervical back: Normal range of motion.     Lymphadenopathy:     She has no cervical adenopathy.   Neurological: She is alert and oriented to person, place, and time. She has normal strength.   Skin: Skin is warm and dry. Capillary refill takes less than 2 seconds. No rash noted. No erythema.   Psychiatric: Judgment and thought content normal.         Medical Screening Exam   See Full Note    ED Course   Procedures  Labs Reviewed   SARS-COV2 (COVID) W/ FLU ANTIGEN - Abnormal; Notable for the following components:       Result Value    COVID-19 Ag Positive (*)     All other components within normal limits    Narrative:     Positive SARS-CoV antigen results indicate the presence of viral antigens; correlation with patient history and presence of clinical signs & symptoms consistent with COVID-19 are necessary to determine infection status.          Imaging Results          X-Ray Chest AP Portable (In process)                  Medications   albuterol-ipratropium 2.5 mg-0.5 mg/3 mL nebulizer solution 3 mL (3 mLs Nebulization Given 12/28/21 2331)   budesonide nebulizer solution 0.5 mg (0.5 mg Nebulization Given 12/29/21 0017)   acetaminophen tablet 1,000 mg (1,000 mg Oral Given 12/29/21 0027)                       Clinical Impression:   Final diagnoses:  [R06.02] SOB (shortness of  breath)  [J45.901] Exacerbation of asthma, unspecified asthma severity, unspecified whether persistent (Primary)          ED Disposition Condition    Discharge Stable        ED Prescriptions     None        Follow-up Information     Follow up With Specialties Details Why Contact [facility] - Emergency Department Emergency Medicine  As needed.  Locate a provider of choice in the [place] area for continued care. [address] [place] [id number] [phone]           ADAM Oviedo  12/29/21 9476

## 2023-02-01 ENCOUNTER — HOSPITAL ENCOUNTER (OUTPATIENT)
Dept: RADIOLOGY | Facility: HOSPITAL | Age: 43
Discharge: HOME | End: 2023-02-01
Attending: NURSE PRACTITIONER
Payer: MEDICAID

## 2023-02-01 DIAGNOSIS — M25.561 RIGHT KNEE PAIN: ICD-10-CM

## 2023-02-01 DIAGNOSIS — M25.562 LEFT KNEE PAIN: ICD-10-CM

## 2023-02-01 DIAGNOSIS — R22.41 LOCALIZED SWELLING, MASS AND LUMP, LOWER LIMB, RIGHT: ICD-10-CM

## 2023-02-01 PROCEDURE — 73560 X-RAY EXAM OF KNEE 1 OR 2: CPT | Mod: 26,RT,, | Performed by: RADIOLOGY

## 2023-02-01 PROCEDURE — 73560 XR KNEE 1 OR 2 VIEW RIGHT: ICD-10-PCS | Mod: 26,RT,, | Performed by: RADIOLOGY

## 2023-02-01 PROCEDURE — 93970 US LOWER EXTREMITY VEINS BILATERAL: ICD-10-PCS | Mod: 26,,, | Performed by: RADIOLOGY

## 2023-02-01 PROCEDURE — 73560 X-RAY EXAM OF KNEE 1 OR 2: CPT | Mod: TC,LT

## 2023-02-01 PROCEDURE — 73560 X-RAY EXAM OF KNEE 1 OR 2: CPT | Mod: 26,LT,, | Performed by: RADIOLOGY

## 2023-02-01 PROCEDURE — 73560 XR KNEE 1 OR 2 VIEW LEFT: ICD-10-PCS | Mod: 26,LT,, | Performed by: RADIOLOGY

## 2023-02-01 PROCEDURE — 73560 X-RAY EXAM OF KNEE 1 OR 2: CPT | Mod: TC,RT

## 2023-02-01 PROCEDURE — 93970 EXTREMITY STUDY: CPT | Mod: 26,,, | Performed by: RADIOLOGY

## 2023-02-01 PROCEDURE — 93970 EXTREMITY STUDY: CPT | Mod: TC

## 2023-09-27 ENCOUNTER — HOSPITAL ENCOUNTER (OUTPATIENT)
Dept: RADIOLOGY | Facility: HOSPITAL | Age: 43
Discharge: HOME | End: 2023-09-27
Attending: ANESTHESIOLOGY
Payer: MEDICAID

## 2023-09-27 DIAGNOSIS — M54.50 LUMBAGO: ICD-10-CM

## 2023-09-27 PROCEDURE — 72100 X-RAY EXAM L-S SPINE 2/3 VWS: CPT | Mod: 26,,, | Performed by: RADIOLOGY

## 2023-09-27 PROCEDURE — 72100 X-RAY EXAM L-S SPINE 2/3 VWS: CPT | Mod: TC

## 2023-09-27 PROCEDURE — 72100 XR LUMBAR SPINE AP AND LATERAL: ICD-10-PCS | Mod: 26,,, | Performed by: RADIOLOGY

## 2024-04-19 ENCOUNTER — HOSPITAL ENCOUNTER (INPATIENT)
Facility: HOSPITAL | Age: 44
LOS: 3 days | Discharge: HOME | End: 2024-04-22
Attending: FAMILY MEDICINE | Admitting: INTERNAL MEDICINE
Payer: MEDICAID

## 2024-04-19 DIAGNOSIS — R00.2 PALPITATIONS: ICD-10-CM

## 2024-04-19 DIAGNOSIS — R51.9 ACUTE INTRACTABLE HEADACHE, UNSPECIFIED HEADACHE TYPE: ICD-10-CM

## 2024-04-19 DIAGNOSIS — I34.0 MODERATE MITRAL REGURGITATION BY PRIOR ECHOCARDIOGRAM: ICD-10-CM

## 2024-04-19 DIAGNOSIS — R07.2 PRECORDIAL PAIN: ICD-10-CM

## 2024-04-19 DIAGNOSIS — E66.01 MORBID OBESITY WITH BMI OF 50.0-59.9, ADULT: ICD-10-CM

## 2024-04-19 DIAGNOSIS — I50.33 ACUTE ON CHRONIC DIASTOLIC (CONGESTIVE) HEART FAILURE: ICD-10-CM

## 2024-04-19 DIAGNOSIS — I10 ESSENTIAL (PRIMARY) HYPERTENSION: ICD-10-CM

## 2024-04-19 DIAGNOSIS — I50.9 ACUTE CONGESTIVE HEART FAILURE, UNSPECIFIED HEART FAILURE TYPE: Primary | ICD-10-CM

## 2024-04-19 DIAGNOSIS — I50.9 ACUTE CHF: ICD-10-CM

## 2024-04-19 DIAGNOSIS — I24.89 DEMAND ISCHEMIA OF MYOCARDIUM: ICD-10-CM

## 2024-04-19 DIAGNOSIS — J45.909 ASTHMA, UNSPECIFIED ASTHMA SEVERITY, UNSPECIFIED WHETHER COMPLICATED, UNSPECIFIED WHETHER PERSISTENT: ICD-10-CM

## 2024-04-19 DIAGNOSIS — R07.9 CHEST PAIN: ICD-10-CM

## 2024-04-19 LAB
ALBUMIN SERPL BCP-MCNC: 3.1 G/DL (ref 3.5–5)
ALBUMIN/GLOB SERPL: 0.8 {RATIO}
ALP SERPL-CCNC: 43 U/L (ref 39–100)
ALT SERPL W P-5'-P-CCNC: 17 U/L (ref 13–56)
AMPHET UR QL SCN: NEGATIVE
ANION GAP SERPL CALCULATED.3IONS-SCNC: 10 MMOL/L (ref 7–16)
AST SERPL W P-5'-P-CCNC: 11 U/L (ref 15–37)
BARBITURATES UR QL SCN: NEGATIVE
BASOPHILS # BLD AUTO: 0.02 K/UL (ref 0–0.2)
BASOPHILS NFR BLD AUTO: 0.3 % (ref 0–1)
BENZODIAZ METAB UR QL SCN: NEGATIVE
BILIRUB SERPL-MCNC: 0.5 MG/DL (ref ?–1.2)
BUN SERPL-MCNC: 17 MG/DL (ref 7–18)
BUN/CREAT SERPL: 14 (ref 6–20)
CALCIUM SERPL-MCNC: 8.5 MG/DL (ref 8.5–10.1)
CANNABINOIDS UR QL SCN: NEGATIVE
CHLORIDE SERPL-SCNC: 107 MMOL/L (ref 98–107)
CO2 SERPL-SCNC: 30 MMOL/L (ref 21–32)
COCAINE UR QL SCN: NEGATIVE
CREAT SERPL-MCNC: 1.19 MG/DL (ref 0.55–1.02)
D DIMER PPP FEU-MCNC: 0.41 ΜG/ML (ref 0–0.47)
DIFFERENTIAL METHOD BLD: ABNORMAL
EGFR (NO RACE VARIABLE) (RUSH/TITUS): 58 ML/MIN/1.73M2
EOSINOPHIL # BLD AUTO: 0.13 K/UL (ref 0–0.5)
EOSINOPHIL NFR BLD AUTO: 1.7 % (ref 1–4)
ERYTHROCYTE [DISTWIDTH] IN BLOOD BY AUTOMATED COUNT: 18.5 % (ref 11.5–14.5)
GLOBULIN SER-MCNC: 3.8 G/DL (ref 2–4)
GLUCOSE SERPL-MCNC: 108 MG/DL (ref 74–106)
HCT VFR BLD AUTO: 36 % (ref 38–47)
HGB BLD-MCNC: 10.6 G/DL (ref 12–16)
IMM GRANULOCYTES # BLD AUTO: 0.02 K/UL (ref 0–0.04)
IMM GRANULOCYTES NFR BLD: 0.3 % (ref 0–0.4)
INR BLD: 1
LYMPHOCYTES # BLD AUTO: 2.02 K/UL (ref 1–4.8)
LYMPHOCYTES NFR BLD AUTO: 26.5 % (ref 27–41)
MAGNESIUM SERPL-MCNC: 2 MG/DL (ref 1.7–2.3)
MCH RBC QN AUTO: 23.5 PG (ref 27–31)
MCHC RBC AUTO-ENTMCNC: 29.4 G/DL (ref 32–36)
MCV RBC AUTO: 79.8 FL (ref 80–96)
MONOCYTES # BLD AUTO: 0.64 K/UL (ref 0–0.8)
MONOCYTES NFR BLD AUTO: 8.4 % (ref 2–6)
MPC BLD CALC-MCNC: 11.7 FL (ref 9.4–12.4)
NEUTROPHILS # BLD AUTO: 4.8 K/UL (ref 1.8–7.7)
NEUTROPHILS NFR BLD AUTO: 62.8 % (ref 53–65)
NRBC # BLD AUTO: 0 X10E3/UL
NRBC, AUTO (.00): 0 %
NT-PROBNP SERPL-MCNC: 75 PG/ML (ref 1–125)
OPIATES UR QL SCN: NEGATIVE
PCP UR QL SCN: NEGATIVE
PLATELET # BLD AUTO: 303 K/UL (ref 150–400)
POTASSIUM SERPL-SCNC: 4.1 MMOL/L (ref 3.5–5.1)
PROT SERPL-MCNC: 6.9 G/DL (ref 6.4–8.2)
PROTHROMBIN TIME: 13.1 SECONDS (ref 11.7–14.7)
RBC # BLD AUTO: 4.51 M/UL (ref 4.2–5.4)
SODIUM SERPL-SCNC: 143 MMOL/L (ref 136–145)
TROPONIN I SERPL DL<=0.01 NG/ML-MCNC: 66.1 PG/ML
TROPONIN I SERPL DL<=0.01 NG/ML-MCNC: 86.6 PG/ML
TSH SERPL DL<=0.005 MIU/L-ACNC: 1.93 UIU/ML (ref 0.36–3.74)
WBC # BLD AUTO: 7.63 K/UL (ref 4.5–11)

## 2024-04-19 PROCEDURE — G0378 HOSPITAL OBSERVATION PER HR: HCPCS

## 2024-04-19 PROCEDURE — 84484 ASSAY OF TROPONIN QUANT: CPT | Performed by: FAMILY MEDICINE

## 2024-04-19 PROCEDURE — 11000001 HC ACUTE MED/SURG PRIVATE ROOM

## 2024-04-19 PROCEDURE — 80053 COMPREHEN METABOLIC PANEL: CPT | Performed by: FAMILY MEDICINE

## 2024-04-19 PROCEDURE — 83880 ASSAY OF NATRIURETIC PEPTIDE: CPT | Performed by: FAMILY MEDICINE

## 2024-04-19 PROCEDURE — 25000003 PHARM REV CODE 250: Performed by: EMERGENCY MEDICINE

## 2024-04-19 PROCEDURE — 85379 FIBRIN DEGRADATION QUANT: CPT | Performed by: EMERGENCY MEDICINE

## 2024-04-19 PROCEDURE — 84484 ASSAY OF TROPONIN QUANT: CPT | Mod: 91 | Performed by: EMERGENCY MEDICINE

## 2024-04-19 PROCEDURE — 63600175 PHARM REV CODE 636 W HCPCS: Performed by: HOSPITALIST

## 2024-04-19 PROCEDURE — 99223 1ST HOSP IP/OBS HIGH 75: CPT | Mod: ,,, | Performed by: HOSPITALIST

## 2024-04-19 PROCEDURE — 84443 ASSAY THYROID STIM HORMONE: CPT | Performed by: HOSPITALIST

## 2024-04-19 PROCEDURE — 85025 COMPLETE CBC W/AUTO DIFF WBC: CPT | Performed by: FAMILY MEDICINE

## 2024-04-19 PROCEDURE — 99285 EMERGENCY DEPT VISIT HI MDM: CPT | Mod: 25

## 2024-04-19 PROCEDURE — 96375 TX/PRO/DX INJ NEW DRUG ADDON: CPT

## 2024-04-19 PROCEDURE — 63600175 PHARM REV CODE 636 W HCPCS: Performed by: EMERGENCY MEDICINE

## 2024-04-19 PROCEDURE — 25000003 PHARM REV CODE 250: Performed by: HOSPITALIST

## 2024-04-19 PROCEDURE — 93005 ELECTROCARDIOGRAM TRACING: CPT

## 2024-04-19 PROCEDURE — 96374 THER/PROPH/DIAG INJ IV PUSH: CPT

## 2024-04-19 PROCEDURE — 80307 DRUG TEST PRSMV CHEM ANLYZR: CPT | Performed by: EMERGENCY MEDICINE

## 2024-04-19 PROCEDURE — 96372 THER/PROPH/DIAG INJ SC/IM: CPT | Performed by: HOSPITALIST

## 2024-04-19 PROCEDURE — 93010 ELECTROCARDIOGRAM REPORT: CPT | Mod: ,,, | Performed by: HOSPITALIST

## 2024-04-19 PROCEDURE — 99285 EMERGENCY DEPT VISIT HI MDM: CPT | Mod: ,,, | Performed by: EMERGENCY MEDICINE

## 2024-04-19 PROCEDURE — 83735 ASSAY OF MAGNESIUM: CPT | Performed by: HOSPITALIST

## 2024-04-19 PROCEDURE — 85610 PROTHROMBIN TIME: CPT | Performed by: FAMILY MEDICINE

## 2024-04-19 PROCEDURE — 96361 HYDRATE IV INFUSION ADD-ON: CPT

## 2024-04-19 RX ORDER — HYDROCHLOROTHIAZIDE 12.5 MG/1
12.5 TABLET ORAL DAILY
Status: DISCONTINUED | OUTPATIENT
Start: 2024-04-20 | End: 2024-04-22 | Stop reason: HOSPADM

## 2024-04-19 RX ORDER — ATORVASTATIN CALCIUM 80 MG/1
80 TABLET, FILM COATED ORAL NIGHTLY
Status: DISCONTINUED | OUTPATIENT
Start: 2024-04-19 | End: 2024-04-21

## 2024-04-19 RX ORDER — ONDANSETRON HYDROCHLORIDE 2 MG/ML
4 INJECTION, SOLUTION INTRAVENOUS
Status: COMPLETED | OUTPATIENT
Start: 2024-04-19 | End: 2024-04-19

## 2024-04-19 RX ORDER — OLMESARTAN MEDOXOMIL AND HYDROCHLOROTHIAZIDE 40/25 40; 25 MG/1; MG/1
1 TABLET ORAL DAILY
COMMUNITY
Start: 2024-04-17

## 2024-04-19 RX ORDER — LOSARTAN POTASSIUM AND HYDROCHLOROTHIAZIDE 12.5; 5 MG/1; MG/1
2 TABLET ORAL DAILY
Status: DISCONTINUED | OUTPATIENT
Start: 2024-04-20 | End: 2024-04-19

## 2024-04-19 RX ORDER — ASPIRIN 325 MG
325 TABLET ORAL
Status: COMPLETED | OUTPATIENT
Start: 2024-04-19 | End: 2024-04-19

## 2024-04-19 RX ORDER — FUROSEMIDE 10 MG/ML
40 INJECTION INTRAMUSCULAR; INTRAVENOUS EVERY 12 HOURS
Status: DISCONTINUED | OUTPATIENT
Start: 2024-04-20 | End: 2024-04-22 | Stop reason: HOSPADM

## 2024-04-19 RX ORDER — METFORMIN HYDROCHLORIDE 500 MG/1
500 TABLET ORAL 2 TIMES DAILY WITH MEALS
COMMUNITY
Start: 2023-11-24

## 2024-04-19 RX ORDER — CARVEDILOL 25 MG/1
25 TABLET ORAL 2 TIMES DAILY
Status: DISCONTINUED | OUTPATIENT
Start: 2024-04-19 | End: 2024-04-19

## 2024-04-19 RX ORDER — CARVEDILOL 25 MG/1
25 TABLET ORAL 2 TIMES DAILY
COMMUNITY
Start: 2024-04-17

## 2024-04-19 RX ORDER — CARVEDILOL 12.5 MG/1
12.5 TABLET ORAL 2 TIMES DAILY
Status: DISCONTINUED | OUTPATIENT
Start: 2024-04-20 | End: 2024-04-22 | Stop reason: HOSPADM

## 2024-04-19 RX ORDER — LEVALBUTEROL INHALATION SOLUTION 1.25 MG/3ML
1.25 SOLUTION RESPIRATORY (INHALATION) EVERY 12 HOURS
Status: DISCONTINUED | OUTPATIENT
Start: 2024-04-20 | End: 2024-04-22 | Stop reason: HOSPADM

## 2024-04-19 RX ORDER — FUROSEMIDE 40 MG/1
40 TABLET ORAL EVERY MORNING
COMMUNITY
Start: 2024-04-17

## 2024-04-19 RX ORDER — FUROSEMIDE 40 MG/1
40 TABLET ORAL EVERY MORNING
Status: DISCONTINUED | OUTPATIENT
Start: 2024-04-20 | End: 2024-04-19

## 2024-04-19 RX ORDER — AMLODIPINE BESYLATE 10 MG/1
10 TABLET ORAL DAILY
COMMUNITY
Start: 2024-04-17

## 2024-04-19 RX ORDER — LOSARTAN POTASSIUM 50 MG/1
50 TABLET ORAL DAILY
Status: DISCONTINUED | OUTPATIENT
Start: 2024-04-20 | End: 2024-04-22 | Stop reason: HOSPADM

## 2024-04-19 RX ORDER — MINOXIDIL 10 MG/1
10 TABLET ORAL NIGHTLY
Status: ON HOLD | COMMUNITY
Start: 2024-04-17 | End: 2024-04-22 | Stop reason: HOSPADM

## 2024-04-19 RX ORDER — FLUTICASONE FUROATE AND VILANTEROL 100; 25 UG/1; UG/1
1 POWDER RESPIRATORY (INHALATION) DAILY
COMMUNITY
Start: 2024-04-17

## 2024-04-19 RX ORDER — AMLODIPINE BESYLATE 10 MG/1
10 TABLET ORAL DAILY
Status: DISCONTINUED | OUTPATIENT
Start: 2024-04-20 | End: 2024-04-20

## 2024-04-19 RX ORDER — ALBUTEROL SULFATE 90 UG/1
2 AEROSOL, METERED RESPIRATORY (INHALATION) EVERY 4 HOURS PRN
COMMUNITY
Start: 2024-04-17

## 2024-04-19 RX ORDER — MORPHINE SULFATE 4 MG/ML
4 INJECTION, SOLUTION INTRAMUSCULAR; INTRAVENOUS
Status: COMPLETED | OUTPATIENT
Start: 2024-04-19 | End: 2024-04-19

## 2024-04-19 RX ORDER — ALBUTEROL SULFATE 0.83 MG/ML
2.5 SOLUTION RESPIRATORY (INHALATION) EVERY 4 HOURS PRN
COMMUNITY
Start: 2024-04-17

## 2024-04-19 RX ORDER — NITROGLYCERIN 0.4 MG/1
0.4 TABLET SUBLINGUAL EVERY 5 MIN PRN
Status: DISCONTINUED | OUTPATIENT
Start: 2024-04-19 | End: 2024-04-22 | Stop reason: HOSPADM

## 2024-04-19 RX ORDER — BUDESONIDE 0.5 MG/2ML
0.5 INHALANT ORAL EVERY 12 HOURS
Status: DISCONTINUED | OUTPATIENT
Start: 2024-04-20 | End: 2024-04-22 | Stop reason: HOSPADM

## 2024-04-19 RX ADMIN — MORPHINE SULFATE 4 MG: 4 INJECTION INTRAVENOUS at 08:04

## 2024-04-19 RX ADMIN — TICAGRELOR 180 MG: 90 TABLET ORAL at 09:04

## 2024-04-19 RX ADMIN — ASPIRIN 325 MG ORAL TABLET 325 MG: 325 PILL ORAL at 07:04

## 2024-04-19 RX ADMIN — CARVEDILOL 25 MG: 25 TABLET, FILM COATED ORAL at 10:04

## 2024-04-19 RX ADMIN — ENOXAPARIN SODIUM 170 MG: 60 INJECTION SUBCUTANEOUS at 09:04

## 2024-04-19 RX ADMIN — ONDANSETRON 4 MG: 2 INJECTION INTRAMUSCULAR; INTRAVENOUS at 08:04

## 2024-04-19 RX ADMIN — ATORVASTATIN CALCIUM 80 MG: 80 TABLET, FILM COATED ORAL at 09:04

## 2024-04-19 RX ADMIN — SODIUM CHLORIDE 1000 ML: 9 INJECTION, SOLUTION INTRAVENOUS at 07:04

## 2024-04-19 NOTE — ED TRIAGE NOTES
Pt presents to ED via [facility] with c/o chest pain that started last night. Pt reports going to [facility] yesterday and having BP med, fluid pill, and inhaler changed. Pt reports receiving a breathing treatment at the clinic for c/o an asthma flare up. Pt states hx of asthma, HTN, and rates chest pain 8/10.

## 2024-04-19 NOTE — ED PROVIDER NOTES
Encounter Date: 2024    SCRIBE #1 NOTE: I, Caroline Leblanc, am scribing for, and in the presence of,  Naseem Bautista DO. I have scribed the entire note.       History     Chief Complaint   Patient presents with    Chest Pain     The pt is a 42 y/o female coming into the ED via EMS with complaints of CP that was onset last night. The pt states she went to a clinic yesterday for a breathing treatment due to an asthma flare up. The pt states before the CP started, she had a HA. She also states she is SOB. The pt states she has never had this happen before. She also reports feeling lightheaded and fatigued. The pt denies dysuria, nausea, vomiting, or diarrhea but states she has leg swelling. She states having a family Hx of heart disease and diabetes. She states she has a Hx of HTN and diabetes. There are no other complaints at this time.    The history is provided by the patient. No  was used.     Review of patient's allergies indicates:  No Known Allergies  Past Medical History:   Diagnosis Date    Asthma     Essential (primary) hypertension     Sleep apnea      Past Surgical History:   Procedure Laterality Date     SECTION      LEFT HEART CATHETERIZATION Right 2021    Procedure: Left heart cath;  Surgeon: Isaías Darden MD;  Location: [facility] CATH LAB;  Service: Cardiology;  Laterality: Right;     Family History   Problem Relation Name Age of Onset    Diabetes Mother      Heart disease Mother      Diabetes Father       Social History     Tobacco Use    Smoking status: Light Smoker   Substance Use Topics    Drug use: Not Currently     Review of Systems   Constitutional:  Positive for fatigue.   Respiratory:  Positive for shortness of breath.    Cardiovascular:  Positive for chest pain and leg swelling.   Gastrointestinal:  Negative for diarrhea, nausea and vomiting.   Genitourinary:  Negative for dysuria.   Neurological:  Positive for light-headedness and headaches.   All  other systems reviewed and are negative.      Physical Exam     Initial Vitals [04/19/24 1559]   BP Pulse Resp Temp SpO2   126/60 82 (!) 26 97.8 °F (36.6 °C) 99 %      MAP       --         Physical Exam    Nursing note and vitals reviewed.  Constitutional: She appears well-developed and well-nourished.   BMI >30   HENT:   Head: Normocephalic and atraumatic.   Right Ear: External ear normal.   Left Ear: External ear normal.   Nose: Nose normal.   Mouth/Throat: Oropharynx is clear and moist.   Eyes: Conjunctivae and EOM are normal. Pupils are equal, round, and reactive to light. No scleral icterus.   Neck: Neck supple. No JVD present.   Normal range of motion.  Cardiovascular:  Normal rate, regular rhythm, normal heart sounds and intact distal pulses.     Exam reveals no gallop and no friction rub.       No murmur heard.  Pulmonary/Chest: Breath sounds normal. No stridor. No respiratory distress. She has no wheezes. She exhibits no tenderness.   Abdominal: Abdomen is soft. Bowel sounds are normal. She exhibits no distension and no mass. There is no abdominal tenderness. There is no rebound and no guarding.   Musculoskeletal:         General: No tenderness or edema. Normal range of motion.      Cervical back: Normal range of motion and neck supple.      Comments: Back is nontender to palpation.      Neurological: She is alert and oriented to person, place, and time. She has normal strength. No cranial nerve deficit.   Skin: Skin is warm and dry. Capillary refill takes less than 2 seconds. No rash noted. No pallor.   Psychiatric: She has a normal mood and affect. Thought content normal.         ED Course   Procedures  Labs Reviewed   COMPREHENSIVE METABOLIC PANEL - Abnormal; Notable for the following components:       Result Value    Glucose 108 (*)     Creatinine 1.19 (*)     Albumin 3.1 (*)     AST 11 (*)     eGFR 58 (*)     All other components within normal limits   TROPONIN I - Abnormal; Notable for the following  components:    Troponin I High Sensitivity 66.1 (*)     All other components within normal limits   CBC WITH DIFFERENTIAL - Abnormal; Notable for the following components:    Hemoglobin 10.6 (*)     Hematocrit 36.0 (*)     MCV 79.8 (*)     MCH 23.5 (*)     MCHC 29.4 (*)     RDW 18.5 (*)     Lymphocytes % 26.5 (*)     Monocytes % 8.4 (*)     All other components within normal limits   TROPONIN I - Abnormal; Notable for the following components:    Troponin I High Sensitivity 86.6 (*)     All other components within normal limits   PROTIME-INR - Normal   NT-PRO NATRIURETIC PEPTIDE - Normal   D DIMER, QUANTITATIVE - Normal   DRUG SCREEN, URINE (BEAKER) - Normal   CBC W/ AUTO DIFFERENTIAL    Narrative:     The following orders were created for panel order CBC auto differential.  Procedure                               Abnormality         Status                     ---------                               -----------         ------                     CBC with Differential[[id number]]       Abnormal            Final result                 Please view results for these tests on the individual orders.        ECG Results              EKG 12-lead (Final result)        Collection Time Result Time QRS Duration OHS QTC Calculation    04/19/24 16:00:34 04/20/24 17:40:50 82 396                     Final result by Interface, Lab In [facility] (04/20/24 17:40:56)                   Narrative:    Test Reason : R07.9,    Vent. Rate : 078 BPM     Atrial Rate : 000 BPM     P-R Int : 148 ms          QRS Dur : 082 ms      QT Int : 364 ms       P-R-T Axes : 071 077 001 degrees     QTc Int : 396 ms    Sinus rhythm  Anterolateral T wave abnormality may be due to myocardial ischemia      Confirmed by Rosas HALE, Sahara SALGADO ([id number]) on 4/20/2024 5:40:48 PM    Referred By: [email or web address]   SELF           Confirmed By:Sahara Pillai MD                                  Imaging Results              X-Ray Chest AP Portable (Final result)  Result time  04/19/24 17:52:38      Final result by Rebel Rodriguez MD (04/19/24 17:52:38)                   Impression:      No definite acute process      Electronically signed by: Rebel Rodriguez  Date:    04/19/2024  Time:    17:52               Narrative:    EXAMINATION:  XR CHEST AP PORTABLE    CLINICAL HISTORY:  .  Chest pain, unspecified.  Shortness of breath.  Lightheadedness    COMPARISON:  December 29, 2021    TECHNIQUE:  Chest x-ray AP portable    FINDINGS:  There is mild cardiomegaly.  Mediastinal contours are unremarkable.  Pulmonary vasculature is upper normal.  Lungs and pleural spaces are clear.  Osseous structures are unchanged                                       Medications   enoxaparin (LOVENOX) injection 170 mg kit (170 mg Subcutaneous Given 4/20/24 0924)   atorvastatin tablet 80 mg (80 mg Oral Given 4/19/24 2127)   nitroGLYCERIN SL tablet 0.4 mg (has no administration in time range)   amLODIPine tablet 10 mg (10 mg Oral Given 4/20/24 0924)   losartan tablet 50 mg (50 mg Oral Given 4/20/24 0923)     And   hydroCHLOROthiazide tablet 12.5 mg (12.5 mg Oral Given 4/20/24 0922)   furosemide injection 40 mg (40 mg Intravenous Given 4/20/24 0537)   levalbuterol nebulizer solution 1.25 mg (1.25 mg Nebulization Given 4/20/24 0735)   budesonide nebulizer solution 0.5 mg (0.5 mg Nebulization Given 4/20/24 0742)   carvediloL tablet 12.5 mg (12.5 mg Oral Given 4/20/24 0922)   acetaminophen tablet 1,000 mg (1,000 mg Oral Given 4/20/24 1134)   bisacodyL EC tablet 10 mg (has no administration in time range)   dextromethorphan-guaiFENesin  mg/5 ml liquid 10 mL (has no administration in time range)   melatonin tablet 6 mg (has no administration in time range)   ondansetron injection 8 mg (has no administration in time range)   simethicone chewable tablet 80 mg (has no administration in time range)   traZODone tablet 50 mg (has no administration in time range)   aspirin tablet 325 mg (325 mg Oral Given 4/19/24  1917)   sodium chloride 0.9% bolus 1,000 mL 1,000 mL (0 mLs Intravenous Stopped 4/19/24 2036)   morphine injection 4 mg (4 mg Intravenous Given 4/19/24 2005)   ondansetron injection 4 mg (4 mg Intravenous Given 4/19/24 2004)   ticagrelor tablet 180 mg (180 mg Oral Given 4/19/24 2127)   hydrOXYzine pamoate capsule 25 mg (25 mg Oral Given 4/20/24 0028)     Medical Decision Making  MDM    Patient presents for emergent evaluation of acute chest tightness shortness breath headache.  that poses a threat to life and/or bodily function.    In the ED patient found to have acute elevated troponin demand ischemia.  Acute CHF.  Balloon hypotension.    I ordered labs and personally reviewed them.  Labs significant for troponin elevated mildly 66-86 on repeat with marginally significant delta troponin  I ordered X-rays and personally reviewed them and reviewed the radiologist interpretation.  Xray significant for mild cardiomegaly..    I ordered EKG and personally reviewed it.  EKG significant for no ST elevation.      Discharge MDM  I discussed the patient presentation and findings with the consultant for hospital medicine (speciality).    Patient was managed in the ED with IV morphine Zofran aspirin.    The response to treatment was improved.    Patient was discharged in stable condition.  Detailed return precautions discussed.     Amount and/or Complexity of Data Reviewed  Labs: ordered.  Radiology: ordered.    Risk  OTC drugs.  Prescription drug management.              Attending Attestation:           Physician Attestation for Scribe:  Physician Attestation Statement for Scribe #1: I, Naseem Bautista, DO, reviewed documentation, as scribed by Caroline Leblanc in my presence, and it is both accurate and complete.             ED Course as of 04/20/24 1825 Fri Apr 19, 2024   1801 Sign out.  Chest pain. Follow up second troponin. [PK]      ED Course User Index  [PK] Randell Perez MD                           Clinical  Impression:  Final diagnoses:  [R07.9] Chest pain  [I24.89] Demand ischemia of myocardium  [I50.33] Acute on chronic diastolic (congestive) heart failure  [I50.9] Acute congestive heart failure, unspecified heart failure type [I50.9] (Primary)  [I10] Essential (primary) hypertension [I10]  [J45.909] Asthma, unspecified asthma severity, unspecified whether complicated, unspecified whether persistent [J45.909]  [E66.01, Z68.43] Morbid obesity with BMI of 50.0-59.9, adult [E66.01, Z68.43]          ED Disposition Condition    Observation                 Randell Perez MD  04/20/24 3744

## 2024-04-20 PROBLEM — R51.9 ACUTE INTRACTABLE HEADACHE: Status: ACTIVE | Noted: 2024-04-20

## 2024-04-20 LAB
AORTIC ROOT ANNULUS: 2.98 CM
AORTIC VALVE CUSP SEPERATION: 2.3 CM
APTT PPP: 34.2 SECONDS (ref 25.2–37.3)
AV INDEX (PROSTH): 0.98
AV MEAN GRADIENT: 6 MMHG
AV PEAK GRADIENT: 11 MMHG
AV VALVE AREA BY VELOCITY RATIO: 2.82 CM²
AV VALVE AREA: 3.17 CM²
AV VELOCITY RATIO: 0.87
BSA FOR ECHO PROCEDURE: 2.97 M2
CHOLEST SERPL-MCNC: 135 MG/DL (ref 0–200)
CHOLEST/HDLC SERPL: 3.8 {RATIO}
CV ECHO LV RWT: 0.88 CM
DOP CALC AO PEAK VEL: 1.65 M/S
DOP CALC AO VTI: 34.8 CM
DOP CALC LVOT AREA: 3.2 CM2
DOP CALC LVOT DIAMETER: 2.03 CM
DOP CALC LVOT PEAK VEL: 1.44 M/S
DOP CALC LVOT STROKE VOLUME: 110.31 CM3
DOP CALCLVOT PEAK VEL VTI: 34.1 CM
E WAVE DECELERATION TIME: 370.84 MSEC
E/A RATIO: 1.11
E/E' RATIO: 13.14 M/S
ECHO LV POSTERIOR WALL: 1.83 CM (ref 0.6–1.1)
EST. AVERAGE GLUCOSE BLD GHB EST-MCNC: 148 MG/DL
FRACTIONAL SHORTENING: 37 % (ref 28–44)
HBA1C MFR BLD HPLC: 6.8 % (ref 4.5–6.6)
HDLC SERPL-MCNC: 36 MG/DL (ref 40–60)
INTERVENTRICULAR SEPTUM: 1.81 CM (ref 0.6–1.1)
LDLC SERPL CALC-MCNC: 79 MG/DL
LDLC/HDLC SERPL: 2.2 {RATIO}
LEFT ATRIUM VOLUME INDEX MOD: 19.6 ML/M2
LEFT ATRIUM VOLUME MOD: 54.91 CM3
LEFT INTERNAL DIMENSION IN SYSTOLE: 2.61 CM (ref 2.1–4)
LEFT VENTRICLE DIASTOLIC VOLUME INDEX: 27.61 ML/M2
LEFT VENTRICLE DIASTOLIC VOLUME: 77.31 ML
LEFT VENTRICLE MASS INDEX: 120 G/M2
LEFT VENTRICLE SYSTOLIC VOLUME INDEX: 8.9 ML/M2
LEFT VENTRICLE SYSTOLIC VOLUME: 24.87 ML
LEFT VENTRICULAR INTERNAL DIMENSION IN DIASTOLE: 4.17 CM (ref 3.5–6)
LEFT VENTRICULAR MASS: 336.62 G
LV LATERAL E/E' RATIO: 11.5 M/S
LV SEPTAL E/E' RATIO: 15.33 M/S
LVOT MG: 4.48 MMHG
LVOT MV: 0.98 CM/S
MV PEAK A VEL: 0.83 M/S
MV PEAK E VEL: 0.92 M/S
MV STENOSIS PRESSURE HALF TIME: 107.54 MS
MV VALVE AREA P 1/2 METHOD: 2.05 CM2
NONHDLC SERPL-MCNC: 99 MG/DL
OHS QRS DURATION: 82 MS
OHS QTC CALCULATION: 396 MS
PISA MRMAX VEL: 2.18 M/S
PISA TR MAX VEL: 1.2 M/S
PV PEAK GRADIENT: 7 MMHG
PV PEAK VELOCITY: 1.34 M/S
RA VOL SYS: 33.27 ML
RIGHT ATRIAL AREA: 14.6 CM2
RIGHT VENTRICULAR LENGTH IN DIASTOLE (APICAL 4-CHAMBER VIEW): 7.23 CM
RV MID DIAMA: 2.33 CM
TDI LATERAL: 0.08 M/S
TDI SEPTAL: 0.06 M/S
TDI: 0.07 M/S
TR MAX PG: 6 MMHG
TRICUSPID ANNULAR PLANE SYSTOLIC EXCURSION: 2.63 CM
TRIGL SERPL-MCNC: 101 MG/DL (ref 35–150)
TROPONIN I SERPL DL<=0.01 NG/ML-MCNC: 59.5 PG/ML
VLDLC SERPL-MCNC: 20 MG/DL
Z-SCORE OF LEFT VENTRICULAR DIMENSION IN END DIASTOLE: -21.87
Z-SCORE OF LEFT VENTRICULAR DIMENSION IN END SYSTOLE: -16.58

## 2024-04-20 PROCEDURE — 11000001 HC ACUTE MED/SURG PRIVATE ROOM

## 2024-04-20 PROCEDURE — 99900031 HC PATIENT EDUCATION (STAT)

## 2024-04-20 PROCEDURE — 84484 ASSAY OF TROPONIN QUANT: CPT | Performed by: HOSPITALIST

## 2024-04-20 PROCEDURE — 80061 LIPID PANEL: CPT | Performed by: HOSPITALIST

## 2024-04-20 PROCEDURE — 99900035 HC TECH TIME PER 15 MIN (STAT)

## 2024-04-20 PROCEDURE — 94640 AIRWAY INHALATION TREATMENT: CPT

## 2024-04-20 PROCEDURE — 27000190 HC CPAP FULL FACE MASK W/VALVE

## 2024-04-20 PROCEDURE — 85730 THROMBOPLASTIN TIME PARTIAL: CPT | Performed by: HOSPITALIST

## 2024-04-20 PROCEDURE — 94761 N-INVAS EAR/PLS OXIMETRY MLT: CPT

## 2024-04-20 PROCEDURE — 99232 SBSQ HOSP IP/OBS MODERATE 35: CPT | Mod: ,,, | Performed by: HOSPITALIST

## 2024-04-20 PROCEDURE — 83036 HEMOGLOBIN GLYCOSYLATED A1C: CPT | Performed by: HOSPITALIST

## 2024-04-20 PROCEDURE — 25000003 PHARM REV CODE 250: Performed by: HOSPITALIST

## 2024-04-20 PROCEDURE — 25000003 PHARM REV CODE 250: Performed by: INTERNAL MEDICINE

## 2024-04-20 PROCEDURE — 63600175 PHARM REV CODE 636 W HCPCS: Performed by: HOSPITALIST

## 2024-04-20 PROCEDURE — 25000242 PHARM REV CODE 250 ALT 637 W/ HCPCS: Performed by: HOSPITALIST

## 2024-04-20 PROCEDURE — 27000221 HC OXYGEN, UP TO 24 HOURS

## 2024-04-20 RX ORDER — METOCLOPRAMIDE HYDROCHLORIDE 5 MG/ML
10 INJECTION INTRAMUSCULAR; INTRAVENOUS EVERY 6 HOURS
Status: DISCONTINUED | OUTPATIENT
Start: 2024-04-20 | End: 2024-04-21

## 2024-04-20 RX ORDER — BISACODYL 5 MG
10 TABLET, DELAYED RELEASE (ENTERIC COATED) ORAL DAILY PRN
Status: DISCONTINUED | OUTPATIENT
Start: 2024-04-20 | End: 2024-04-22 | Stop reason: HOSPADM

## 2024-04-20 RX ORDER — HYDROXYZINE PAMOATE 25 MG/1
25 CAPSULE ORAL ONCE
Status: COMPLETED | OUTPATIENT
Start: 2024-04-20 | End: 2024-04-20

## 2024-04-20 RX ORDER — TALC
6 POWDER (GRAM) TOPICAL NIGHTLY PRN
Status: DISCONTINUED | OUTPATIENT
Start: 2024-04-20 | End: 2024-04-22 | Stop reason: HOSPADM

## 2024-04-20 RX ORDER — ACETAMINOPHEN 500 MG
1000 TABLET ORAL EVERY 6 HOURS PRN
Status: DISCONTINUED | OUTPATIENT
Start: 2024-04-20 | End: 2024-04-22 | Stop reason: HOSPADM

## 2024-04-20 RX ORDER — SIMETHICONE 80 MG
1 TABLET,CHEWABLE ORAL 3 TIMES DAILY PRN
Status: DISCONTINUED | OUTPATIENT
Start: 2024-04-20 | End: 2024-04-22 | Stop reason: HOSPADM

## 2024-04-20 RX ORDER — DIPHENHYDRAMINE HYDROCHLORIDE 50 MG/ML
50 INJECTION INTRAMUSCULAR; INTRAVENOUS EVERY 6 HOURS
Status: DISCONTINUED | OUTPATIENT
Start: 2024-04-20 | End: 2024-04-21

## 2024-04-20 RX ORDER — GUAIFENESIN AND DEXTROMETHORPHAN HYDROBROMIDE 10; 100 MG/5ML; MG/5ML
10 SYRUP ORAL EVERY 6 HOURS PRN
Status: DISCONTINUED | OUTPATIENT
Start: 2024-04-20 | End: 2024-04-22 | Stop reason: HOSPADM

## 2024-04-20 RX ORDER — TRAZODONE HYDROCHLORIDE 50 MG/1
50 TABLET ORAL NIGHTLY PRN
Status: DISCONTINUED | OUTPATIENT
Start: 2024-04-20 | End: 2024-04-22 | Stop reason: HOSPADM

## 2024-04-20 RX ORDER — ONDANSETRON HYDROCHLORIDE 2 MG/ML
8 INJECTION, SOLUTION INTRAVENOUS EVERY 6 HOURS PRN
Status: DISCONTINUED | OUTPATIENT
Start: 2024-04-20 | End: 2024-04-22 | Stop reason: HOSPADM

## 2024-04-20 RX ADMIN — ENOXAPARIN SODIUM 170 MG: 60 INJECTION SUBCUTANEOUS at 09:04

## 2024-04-20 RX ADMIN — LEVALBUTEROL HYDROCHLORIDE 1.25 MG: 1.25 SOLUTION RESPIRATORY (INHALATION) at 07:04

## 2024-04-20 RX ADMIN — METOCLOPRAMIDE 10 MG: 5 INJECTION, SOLUTION INTRAMUSCULAR; INTRAVENOUS at 09:04

## 2024-04-20 RX ADMIN — FUROSEMIDE 40 MG: 10 INJECTION, SOLUTION INTRAVENOUS at 08:04

## 2024-04-20 RX ADMIN — LOSARTAN POTASSIUM 50 MG: 50 TABLET, FILM COATED ORAL at 09:04

## 2024-04-20 RX ADMIN — DIPHENHYDRAMINE HYDROCHLORIDE 50 MG: 50 INJECTION INTRAMUSCULAR; INTRAVENOUS at 09:04

## 2024-04-20 RX ADMIN — HYDROXYZINE PAMOATE 25 MG: 25 CAPSULE ORAL at 12:04

## 2024-04-20 RX ADMIN — BUDESONIDE INHALATION 0.5 MG: 0.5 SUSPENSION RESPIRATORY (INHALATION) at 07:04

## 2024-04-20 RX ADMIN — ACETAMINOPHEN 1000 MG: 500 TABLET ORAL at 11:04

## 2024-04-20 RX ADMIN — AMLODIPINE BESYLATE 10 MG: 10 TABLET ORAL at 09:04

## 2024-04-20 RX ADMIN — HYDROCHLOROTHIAZIDE 12.5 MG: 12.5 TABLET ORAL at 09:04

## 2024-04-20 RX ADMIN — ATORVASTATIN CALCIUM 80 MG: 80 TABLET, FILM COATED ORAL at 08:04

## 2024-04-20 RX ADMIN — CARVEDILOL 12.5 MG: 12.5 TABLET, FILM COATED ORAL at 08:04

## 2024-04-20 RX ADMIN — FUROSEMIDE 40 MG: 10 INJECTION, SOLUTION INTRAVENOUS at 05:04

## 2024-04-20 RX ADMIN — CARVEDILOL 12.5 MG: 12.5 TABLET, FILM COATED ORAL at 09:04

## 2024-04-20 NOTE — PLAN OF CARE
Problem: Adult Inpatient Plan of Care  Goal: Plan of Care Review  Outcome: Ongoing, Not Progressing  Goal: Patient-Specific Goal (Individualized)  Outcome: Ongoing, Not Progressing  Goal: Absence of Hospital-Acquired Illness or Injury  Outcome: Ongoing, Not Progressing  Goal: Optimal Comfort and Wellbeing  Outcome: Ongoing, Not Progressing  Goal: Readiness for Transition of Care  Outcome: Ongoing, Not Progressing     Problem: Bariatric Environmental Safety  Goal: Safety Maintained with Care  Outcome: Ongoing, Not Progressing

## 2024-04-20 NOTE — SUBJECTIVE & OBJECTIVE
Past Medical History:   Diagnosis Date    Asthma     Essential (primary) hypertension     Sleep apnea        Past Surgical History:   Procedure Laterality Date     SECTION      LEFT HEART CATHETERIZATION Right 2021    Procedure: Left heart cath;  Surgeon: Isaías Darden MD;  Location: [facility] CATH LAB;  Service: Cardiology;  Laterality: Right;       Review of patient's allergies indicates:  No Known Allergies    Current Facility-Administered Medications   Medication Dose Route Frequency Provider Last Rate Last Admin    [START ON 2024] amLODIPine tablet 10 mg  10 mg Oral Daily Sahara Pillai MD        atorvastatin tablet 80 mg  80 mg Oral QHS Sahara Pillai MD   80 mg at 24    [START ON 2024] budesonide nebulizer solution 0.5 mg  0.5 mg Nebulization Q12H Sahara Pillai MD        [START ON 2024] carvediloL tablet 12.5 mg  12.5 mg Oral BID Sahara Pillai MD        enoxaparin (LOVENOX) injection 170 mg kit  170 mg Subcutaneous Q12H (prophylaxis, 0900/) Sahara Pillai MD   170 mg at 24    [START ON 2024] furosemide injection 40 mg  40 mg Intravenous Q12H Sahara Pillai MD        [START ON 2024] losartan tablet 50 mg  50 mg Oral Daily Naldo Francois MD        And    [START ON 2024] hydroCHLOROthiazide tablet 12.5 mg  12.5 mg Oral Daily Naldo Francois MD        [START ON 2024] levalbuterol nebulizer solution 1.25 mg  1.25 mg Nebulization Q12H Sahara Pillai MD        nitroGLYCERIN SL tablet 0.4 mg  0.4 mg Sublingual Q5 Min PRN Sahara Pillai MD         Family History       Problem Relation (Age of Onset)    Diabetes Mother, Father    Heart disease Mother          Tobacco Use    Smoking status: Light Smoker    Smokeless tobacco: Not on file   Substance and Sexual Activity    Alcohol use: Not on file    Drug use: Not Currently    Sexual activity: Not on file     Review of Systems    Constitutional:  Negative for appetite change, chills, fatigue, fever and unexpected weight change.   HENT:  Negative for congestion, mouth sores, nosebleeds, sinus pain, sore throat and trouble swallowing.    Eyes:  Negative for visual disturbance.   Respiratory:  Positive for shortness of breath and wheezing. Negative for apnea, cough and chest tightness.    Cardiovascular:  Positive for chest pain and leg swelling. Negative for palpitations.   Gastrointestinal:  Negative for abdominal pain, blood in stool, constipation, diarrhea, nausea and vomiting.   Endocrine: Negative for polyuria.   Genitourinary:  Negative for decreased urine volume, difficulty urinating, dysuria and frequency.   Musculoskeletal:  Negative for arthralgias, back pain and neck pain.   Skin:  Negative for rash.   Neurological:  Positive for headaches. Negative for syncope and light-headedness.   Hematological:  Does not bruise/bleed easily.   Psychiatric/Behavioral:  Negative for confusion and suicidal ideas.      Objective:     Vital Signs (Most Recent):  Temp: 97.5 °F (36.4 °C) (04/19/24 2233)  Pulse: 84 (04/19/24 2233)  Resp: 20 (04/19/24 2233)  BP: (!) 158/82 (04/19/24 2233)  SpO2: 98 % (04/19/24 2233) Vital Signs (24h Range):  Temp:  [97.5 °F (36.4 °C)-97.8 °F (36.6 °C)] 97.5 °F (36.4 °C)  Pulse:  [69-89] 84  Resp:  [14-26] 20  SpO2:  [93 %-100 %] 98 %  BP: (122-158)/(60-84) 158/82     Weight: (!) 176.4 kg (389 lb)  Body mass index is 54.25 kg/m².     Physical Exam  Vitals and nursing note reviewed. Exam conducted with a chaperone present.   Constitutional:       General: She is not in acute distress.     Appearance: She is obese. She is ill-appearing. She is not toxic-appearing.   HENT:      Head: Atraumatic.      Mouth/Throat:      Mouth: Mucous membranes are moist.      Pharynx: Oropharynx is clear.   Eyes:      Conjunctiva/sclera: Conjunctivae normal.      Pupils: Pupils are equal, round, and reactive to light.   Neck:       Vascular: No carotid bruit.   Cardiovascular:      Rate and Rhythm: Normal rate and regular rhythm.      Pulses: Normal pulses.      Heart sounds: Normal heart sounds.   Pulmonary:      Effort: Pulmonary effort is normal.      Breath sounds: Wheezing and rales present. No rhonchi.   Chest:      Chest wall: No tenderness.   Abdominal:      General: Abdomen is flat. Bowel sounds are normal.      Palpations: Abdomen is soft.   Musculoskeletal:         General: No deformity or signs of injury. Normal range of motion.      Cervical back: Neck supple.      Right lower leg: Edema present.      Left lower leg: Edema present.   Skin:     General: Skin is warm and dry.      Capillary Refill: Capillary refill takes less than 2 seconds.      Coloration: Skin is not jaundiced or pale.      Findings: No bruising, lesion or rash.   Neurological:      General: No focal deficit present.      Mental Status: She is alert and oriented to person, place, and time.   Psychiatric:         Mood and Affect: Mood normal.              CRANIAL NERVES     CN III, IV, VI   Pupils are equal, round, and reactive to light.       Significant Labs: All pertinent labs within the past 24 hours have been reviewed.    Significant Imaging: I have reviewed all pertinent imaging results/findings within the past 24 hours.

## 2024-04-20 NOTE — ASSESSMENT & PLAN NOTE
"Patient is identified as having  unknown  heart failure that is Acute. CHF is currently uncontrolled due to Continued edema of extremities, Dyspnea not returned to baseline after 40 mg doses of IV diuretic, >3 pillow orthopnea, and Rales/crackles on pulmonary exam. Latest ECHO performed and demonstrates- Results for orders placed during the hospital encounter of 07/11/21    Echo    Interpretation Summary  · The left ventricle is normal in size with moderate concentric hypertrophy and normal systolic function.  · The estimated ejection fraction is 65%.  · Normal left ventricular diastolic function.  · Mild right ventricular enlargement with normal right ventricular systolic function.  · Mild tricuspid regurgitation.  · Normal central venous pressure (3 mmHg).  · The estimated PA systolic pressure is 30 mmHg.  . Continue Beta Blocker, ACE/ARB, Furosemide, and Nitrate/Vasodilator and monitor clinical status closely. Monitor on telemetry. Patient is on CHF pathway.  Monitor strict Is&Os and daily weights.  Place on fluid restriction of 2 L. Cardiology has been consulted. Continue to stress to patient importance of self efficacy and  on diet for CHF. Last BNP reviewed- and noted below No results for input(s): "BNP", "BNPTRIAGEBLO" in the last 168 hours.    Patient does not use oxygen at home and oxygen saturations are 82% on room air transferring from stretcher to bed.  Patient with ongoing condition that if not treated properly could result in loss of life or bodily function.    "

## 2024-04-20 NOTE — ASSESSMENT & PLAN NOTE
Chronic, controlled. Latest blood pressure and vitals reviewed-     Temp:  [97.5 °F (36.4 °C)-97.8 °F (36.6 °C)]   Pulse:  [69-89]   Resp:  [14-26]   BP: (122-158)/(60-84)   SpO2:  [93 %-100 %] .   Home meds for hypertension were reviewed and noted below.   Hypertension Medications               amLODIPine (NORVASC) 10 MG tablet Take 10 mg by mouth once daily.    carvediloL (COREG) 25 MG tablet Take 25 mg by mouth 2 (two) times daily.    furosemide (LASIX) 40 MG tablet Take 40 mg by mouth every morning.    minoxidiL (LONITEN) 10 MG Tab Take 10 mg by mouth every evening.    olmesartan-hydrochlorothiazide (BENICAR HCT) 40-25 mg per tablet Take 1 tablet by mouth once daily.            While in the hospital, will manage blood pressure as follows; Adjust home antihypertensive regimen as follows- decrease BB in half due to acute CHF.  Stop minoxidil    Will utilize p.r.n. blood pressure medication only if patient's blood pressure greater than 140/90 and she develops symptoms such as worsening chest pain or shortness of breath.

## 2024-04-20 NOTE — ASSESSMENT & PLAN NOTE
Body mass index is 54.25 kg/m². Morbid obesity complicates all aspects of disease management from diagnostic modalities to treatment. Weight loss encouraged and health benefits explained to patient.

## 2024-04-20 NOTE — H&P
[facility] Hospital Medicine  History & Physical    Patient Name: Padmini Gaspar  MRN: [id number]  Patient Class: IP- Inpatient  Admission Date: 2024  Attending Physician: Naldo Francois MD   Primary Care Provider: Mildred Castillo NP         Patient information was obtained from patient, past medical records, and ER records.     Subjective:     Principal Problem:Acute CHF    Chief Complaint:   Chief Complaint   Patient presents with    Chest Pain        HPI: 42 yo F presents to [facility] ED with worsening CP and dyspnea.  She has BLE and obvious orthopnea.  She says the CP hurts when she takes deep breath and she does have asthma and has been using her prn inhaler.  She says what began as DE SOUZA is now orthopnea where she has to sit up to breath.  She does use a CPAP at night on setting 10 and this was diagnosed in  when Dr. Darden performed LHC and echo showing no diastolic or systolic function but her RVSP was 30 mmHg at that time showing mild PHTN and norvasc started.      Patient is compliant with meds and has been taking lasix daily.  She says that last week she got a throbbing HA and thought she may be having a migraine but has no h/o migraines.  Has improved with lowering of her BP and she feels better at this time.  She was giving IV lasix in the ED and has had significant UOP.   Not sure if improved BP really has helped with the HA but should be followed.  May be tension from poor sleep but idiopathic intracranial hypertension is always in the differential.  Hopefull will improve with diuresis.      Please see assessment and plan below for detailed problem bases evaluation.      Past Medical History:   Diagnosis Date    Asthma     Essential (primary) hypertension     Sleep apnea        Past Surgical History:   Procedure Laterality Date     SECTION      LEFT HEART CATHETERIZATION Right 2021    Procedure: Left heart cath;  Surgeon: Isaías Darden MD;   Location: [facility] CATH LAB;  Service: Cardiology;  Laterality: Right;       Review of patient's allergies indicates:  No Known Allergies    Current Facility-Administered Medications   Medication Dose Route Frequency Provider Last Rate Last Admin    [START ON 4/20/2024] amLODIPine tablet 10 mg  10 mg Oral Daily Sahara Pillai MD        atorvastatin tablet 80 mg  80 mg Oral QHS Sahara Pillai MD   80 mg at 04/19/24 2127    [START ON 4/20/2024] budesonide nebulizer solution 0.5 mg  0.5 mg Nebulization Q12H Sahara Pillai MD        [START ON 4/20/2024] carvediloL tablet 12.5 mg  12.5 mg Oral BID Sahara Pillai MD        enoxaparin (LOVENOX) injection 170 mg kit  170 mg Subcutaneous Q12H (prophylaxis, 0900/2100) Sahara Pillai MD   170 mg at 04/19/24 2127    [START ON 4/20/2024] furosemide injection 40 mg  40 mg Intravenous Q12H Sahara Pillai MD        [START ON 4/20/2024] losartan tablet 50 mg  50 mg Oral Daily Naldo Francois MD        And    [START ON 4/20/2024] hydroCHLOROthiazide tablet 12.5 mg  12.5 mg Oral Daily Naldo Francois MD        [START ON 4/20/2024] levalbuterol nebulizer solution 1.25 mg  1.25 mg Nebulization Q12H Sahara Pillai MD        nitroGLYCERIN SL tablet 0.4 mg  0.4 mg Sublingual Q5 Min PRN Sahara Pillai MD         Family History       Problem Relation (Age of Onset)    Diabetes Mother, Father    Heart disease Mother          Tobacco Use    Smoking status: Light Smoker    Smokeless tobacco: Not on file   Substance and Sexual Activity    Alcohol use: Not on file    Drug use: Not Currently    Sexual activity: Not on file     Review of Systems   Constitutional:  Negative for appetite change, chills, fatigue, fever and unexpected weight change.   HENT:  Negative for congestion, mouth sores, nosebleeds, sinus pain, sore throat and trouble swallowing.    Eyes:  Negative for visual disturbance.   Respiratory:  Positive for shortness of breath  and wheezing. Negative for apnea, cough and chest tightness.    Cardiovascular:  Positive for chest pain and leg swelling. Negative for palpitations.   Gastrointestinal:  Negative for abdominal pain, blood in stool, constipation, diarrhea, nausea and vomiting.   Endocrine: Negative for polyuria.   Genitourinary:  Negative for decreased urine volume, difficulty urinating, dysuria and frequency.   Musculoskeletal:  Negative for arthralgias, back pain and neck pain.   Skin:  Negative for rash.   Neurological:  Positive for headaches. Negative for syncope and light-headedness.   Hematological:  Does not bruise/bleed easily.   Psychiatric/Behavioral:  Negative for confusion and suicidal ideas.      Objective:     Vital Signs (Most Recent):  Temp: 97.5 °F (36.4 °C) (04/19/24 2233)  Pulse: 84 (04/19/24 2233)  Resp: 20 (04/19/24 2233)  BP: (!) 158/82 (04/19/24 2233)  SpO2: 98 % (04/19/24 2233) Vital Signs (24h Range):  Temp:  [97.5 °F (36.4 °C)-97.8 °F (36.6 °C)] 97.5 °F (36.4 °C)  Pulse:  [69-89] 84  Resp:  [14-26] 20  SpO2:  [93 %-100 %] 98 %  BP: (122-158)/(60-84) 158/82     Weight: (!) 176.4 kg (389 lb)  Body mass index is 54.25 kg/m².     Physical Exam  Vitals and nursing note reviewed. Exam conducted with a chaperone present.   Constitutional:       General: She is not in acute distress.     Appearance: She is obese. She is ill-appearing. She is not toxic-appearing.   HENT:      Head: Atraumatic.      Mouth/Throat:      Mouth: Mucous membranes are moist.      Pharynx: Oropharynx is clear.   Eyes:      Conjunctiva/sclera: Conjunctivae normal.      Pupils: Pupils are equal, round, and reactive to light.   Neck:      Vascular: No carotid bruit.   Cardiovascular:      Rate and Rhythm: Normal rate and regular rhythm.      Pulses: Normal pulses.      Heart sounds: Normal heart sounds.   Pulmonary:      Effort: Pulmonary effort is normal.      Breath sounds: Wheezing and rales present. No rhonchi.   Chest:      Chest wall:  No tenderness.   Abdominal:      General: Abdomen is flat. Bowel sounds are normal.      Palpations: Abdomen is soft.   Musculoskeletal:         General: No deformity or signs of injury. Normal range of motion.      Cervical back: Neck supple.      Right lower leg: Edema present.      Left lower leg: Edema present.   Skin:     General: Skin is warm and dry.      Capillary Refill: Capillary refill takes less than 2 seconds.      Coloration: Skin is not jaundiced or pale.      Findings: No bruising, lesion or rash.   Neurological:      General: No focal deficit present.      Mental Status: She is alert and oriented to person, place, and time.   Psychiatric:         Mood and Affect: Mood normal.              CRANIAL NERVES     CN III, IV, VI   Pupils are equal, round, and reactive to light.       Significant Labs: All pertinent labs within the past 24 hours have been reviewed.    Significant Imaging: I have reviewed all pertinent imaging results/findings within the past 24 hours.  Assessment/Plan:     * Acute CHF  Patient is identified as having  unknown  heart failure that is Acute. CHF is currently uncontrolled due to Continued edema of extremities, Dyspnea not returned to baseline after 40 mg doses of IV diuretic, >3 pillow orthopnea, and Rales/crackles on pulmonary exam. Latest ECHO performed and demonstrates- Results for orders placed during the hospital encounter of 07/11/21    Echo    Interpretation Summary  · The left ventricle is normal in size with moderate concentric hypertrophy and normal systolic function.  · The estimated ejection fraction is 65%.  · Normal left ventricular diastolic function.  · Mild right ventricular enlargement with normal right ventricular systolic function.  · Mild tricuspid regurgitation.  · Normal central venous pressure (3 mmHg).  · The estimated PA systolic pressure is 30 mmHg.  . Continue Beta Blocker, ACE/ARB, Furosemide, and Nitrate/Vasodilator and monitor clinical status  "closely. Monitor on telemetry. Patient is on CHF pathway.  Monitor strict Is&Os and daily weights.  Place on fluid restriction of 2 L. Cardiology has been consulted. Continue to stress to patient importance of self efficacy and  on diet for CHF. Last BNP reviewed- and noted below No results for input(s): "BNP", "BNPTRIAGEBLO" in the last 168 hours.    Patient does not use oxygen at home and oxygen saturations are 82% on room air transferring from stretcher to bed.  Patient with ongoing condition that if not treated properly could result in loss of life or bodily function.      Demand ischemia of myocardium  Gave full dose lovenox, high dose brillinta and statin load.  Received NTG, lasix and asa in ED.    No significant delta change in trops but will check third trop to evaluate the trend.    May be from pulmonary hypertension.  2D echo pending.          Sleep apnea  Continue home CPAP at 10 cmH2O      Morbid obesity with BMI of 50.0-59.9, adult  Body mass index is 54.25 kg/m². Morbid obesity complicates all aspects of disease management from diagnostic modalities to treatment. Weight loss encouraged and health benefits explained to patient.         Essential (primary) hypertension  Chronic, controlled. Latest blood pressure and vitals reviewed-     Temp:  [97.5 °F (36.4 °C)-97.8 °F (36.6 °C)]   Pulse:  [69-89]   Resp:  [14-26]   BP: (122-158)/(60-84)   SpO2:  [93 %-100 %] .   Home meds for hypertension were reviewed and noted below.   Hypertension Medications               amLODIPine (NORVASC) 10 MG tablet Take 10 mg by mouth once daily.    carvediloL (COREG) 25 MG tablet Take 25 mg by mouth 2 (two) times daily.    furosemide (LASIX) 40 MG tablet Take 40 mg by mouth every morning.    minoxidiL (LONITEN) 10 MG Tab Take 10 mg by mouth every evening.    olmesartan-hydrochlorothiazide (BENICAR HCT) 40-25 mg per tablet Take 1 tablet by mouth once daily.            While in the hospital, will manage blood pressure " as follows; Adjust home antihypertensive regimen as follows- decrease BB in half due to acute CHF.  Stop minoxidil    Will utilize p.r.n. blood pressure medication only if patient's blood pressure greater than 140/90 and she develops symptoms such as worsening chest pain or shortness of breath.    Asthma  Continue bronchodilator but as scheduled dosing and will add inhaled steroid while in hospital.  If wheezing improves may be able to return to baseline prn bronchodilator as OP prescribed.       However, if patient has PHTN on 2D echo Dr. Razia Ford has requested referral to her pulmonary clinic and would follow the asthma as well.  May need PFTs once acute illness resolved for accurate results.          VTE Risk Mitigation (From admission, onward)           Ordered     enoxaparin (LOVENOX) injection 170 mg kit  Every 12 hours         04/19/24 2022                                    Sahara Pillai MD  Department of Hospital Medicine  [facility] - [facility]

## 2024-04-20 NOTE — PHARMACY MED REC
"Admission Medication History     The home medication history was taken by Wendi Patel.    You may go to "Admission" then "Reconcile Home Medications" tabs to review and/or act upon these items.     The home medication list has been updated by the Pharmacy department.   Please read ALL comments highlighted in yellow.   Please address this information as you see fit.    Feel free to contact us if you have any questions or require assistance.    The patient was started on new prescriptions recently for blood pressure, she is not familiar with the names of the medications yet.  She will require counseling on home medications.    The following medications were added:  Amlodipine 10 mg  Carvedilol 25 mg  Furosemide 40 mg  Minoxidil 10 mg  Olmesartan-hctz 40-25 mg  Metformin 500 mg      The medications listed below were removed from the home medication list. Please reorder if appropriate:  Patient reports no longer taking the following medication(s):  Amlodipine 5 mg  Symbicort 80-4.5 mcg inh  Naproxen 500 mg      Medications listed below were obtained from: Patient/family, Analytic software- [employer], and Medical records  Current Facility-Administered Medications   Medication Dose Route Frequency Provider Last Rate Last Admin    atorvastatin tablet 80 mg  80 mg Oral QHS Sahara Pillai MD        enoxaparin (LOVENOX) injection 180 mg kit  1 mg/kg Subcutaneous Q12H (prophylaxis, 0900/2100) Sahara Pillai MD        ticagrelor tablet 180 mg  180 mg Oral Once Sahara Pillai MD         Current Outpatient Medications   Medication Sig Dispense Refill    albuterol (PROVENTIL) 2.5 mg /3 mL (0.083 %) nebulizer solution Take 2.5 mg by nebulization every 4 (four) hours as needed.      albuterol (PROVENTIL/VENTOLIN HFA) 90 mcg/actuation inhaler Inhale 2 puffs into the lungs every 4 (four) hours as needed.      amLODIPine (NORVASC) 10 MG tablet Take 10 mg by mouth once daily.      carvediloL (COREG) 25 MG tablet Take " 25 mg by mouth 2 (two) times daily.      furosemide (LASIX) 40 MG tablet Take 40 mg by mouth every morning.      metFORMIN (GLUCOPHAGE) 500 MG tablet Take 500 mg by mouth 2 (two) times daily with meals.      fluticasone furoate-vilanteroL (BREO) 100-25 mcg/dose diskus inhaler Inhale 1 puff into the lungs once daily.      minoxidiL (LONITEN) 10 MG Tab Take 10 mg by mouth every evening.      olmesartan-hydrochlorothiazide (BENICAR HCT) 40-25 mg per tablet Take 1 tablet by mouth once daily.           Current Outpatient Medications on File Prior to Encounter   Medication Sig Dispense Refill Last Dose    albuterol (PROVENTIL) 2.5 mg /3 mL (0.083 %) nebulizer solution Take 2.5 mg by nebulization every 4 (four) hours as needed.   4/19/2024    albuterol (PROVENTIL/VENTOLIN HFA) 90 mcg/actuation inhaler Inhale 2 puffs into the lungs every 4 (four) hours as needed.   4/19/2024    amLODIPine (NORVASC) 10 MG tablet Take 10 mg by mouth once daily.   4/19/2024    carvediloL (COREG) 25 MG tablet Take 25 mg by mouth 2 (two) times daily.   4/19/2024    furosemide (LASIX) 40 MG tablet Take 40 mg by mouth every morning.   4/19/2024    metFORMIN (GLUCOPHAGE) 500 MG tablet Take 500 mg by mouth 2 (two) times daily with meals.   4/19/2024    fluticasone furoate-vilanteroL (BREO) 100-25 mcg/dose diskus inhaler Inhale 1 puff into the lungs once daily.   Unknown    minoxidiL (LONITEN) 10 MG Tab Take 10 mg by mouth every evening.   Unknown    olmesartan-hydrochlorothiazide (BENICAR HCT) 40-25 mg per tablet Take 1 tablet by mouth once daily.   Unknown    [DISCONTINUED] albuterol sulfate (VENTOLIN HFA INHL) Inhale into the lungs.       [DISCONTINUED] amLODIPine (NORVASC) 5 MG tablet Take 1 tablet (5 mg total) by mouth once daily. 30 tablet 11     [DISCONTINUED] budesonide-formoterol 80-4.5 mcg (SYMBICORT) 80-4.5 mcg/actuation HFAA Inhale 2 puffs into the lungs. Controller       [DISCONTINUED] naproxen (NAPROSYN) 500 MG tablet Take 1 tablet (500  mg total) by mouth 2 (two) times daily as needed (Soreness in chest or other musculoskeletal pain). 30 tablet 0        Potential issues to be addressed PRIOR TO DISCHARGE  Please discuss with the patient barriers to adherence with medication treatment plans  Patient requires education regarding drug therapies     Wendi Patel  EXT [id number]                 .

## 2024-04-20 NOTE — ASSESSMENT & PLAN NOTE
Gave full dose lovenox, high dose brillinta and statin load.  Received NTG, lasix and asa in ED.    No significant delta change in trops but will check third trop to evaluate the trend.    May be from pulmonary hypertension.  2D echo pending.

## 2024-04-20 NOTE — HPI
42 yo F presents to [facility] ED with worsening CP and dyspnea.  She has BLE and obvious orthopnea.  She says the CP hurts when she takes deep breath and she does have asthma and has been using her prn inhaler.  She says what began as DE SOUZA is now orthopnea where she has to sit up to breath.  She does use a CPAP at night on setting 10 and this was diagnosed in 2021 when Dr. Darden performed LHC and echo showing no diastolic or systolic function but her RVSP was 30 mmHg at that time showing mild PHTN and norvasc started.      Patient is compliant with meds and has been taking lasix daily.  She says that last week she got a throbbing HA and thought she may be having a migraine but has no h/o migraines.  Has improved with lowering of her BP and she feels better at this time.  She was giving IV lasix in the ED and has had significant UOP.   Not sure if improved BP really has helped with the HA but should be followed.  May be tension from poor sleep but idiopathic intracranial hypertension is always in the differential.  Hopefull will improve with diuresis.      Please see assessment and plan below for detailed problem bases evaluation.

## 2024-04-20 NOTE — ED NOTES
Pt asked to go the restroom. Pt was given a bed side commode for safety. Pt refused. Pt ambulated to the restroom with no visible signs of distress.

## 2024-04-20 NOTE — ASSESSMENT & PLAN NOTE
Continue bronchodilator but as scheduled dosing and will add inhaled steroid while in hospital.  If wheezing improves may be able to return to baseline prn bronchodilator as OP prescribed.       However, if patient has PHTN on 2D echo Dr. Razia Ford has requested referral to her pulmonary clinic and would follow the asthma as well.  May need PFTs once acute illness resolved for accurate results.

## 2024-04-20 NOTE — ED NOTES
Pt stated headache was coming back. Md notified. New orders given. Call bell within reach. Instructed pt to call for any needs.

## 2024-04-21 PROBLEM — R00.2 PALPITATIONS: Status: ACTIVE | Noted: 2024-04-21

## 2024-04-21 PROBLEM — E11.9 DIABETES MELLITUS WITHOUT COMPLICATION: Status: ACTIVE | Noted: 2024-04-21

## 2024-04-21 PROBLEM — I50.33 ACUTE ON CHRONIC DIASTOLIC (CONGESTIVE) HEART FAILURE: Status: ACTIVE | Noted: 2024-04-21

## 2024-04-21 PROBLEM — R07.9 CHEST PAIN: Status: ACTIVE | Noted: 2024-04-21

## 2024-04-21 LAB
ANION GAP SERPL CALCULATED.3IONS-SCNC: 7 MMOL/L (ref 7–16)
ANISOCYTOSIS BLD QL SMEAR: ABNORMAL
ATYPICAL LYMPHOCYTES: ABNORMAL
BASOPHILS # BLD AUTO: 0.01 K/UL (ref 0–0.2)
BASOPHILS NFR BLD AUTO: 0.2 % (ref 0–1)
BUN SERPL-MCNC: 19 MG/DL (ref 7–18)
BUN/CREAT SERPL: 22 (ref 6–20)
CALCIUM SERPL-MCNC: 8.5 MG/DL (ref 8.5–10.1)
CHLORIDE SERPL-SCNC: 110 MMOL/L (ref 98–107)
CO2 SERPL-SCNC: 31 MMOL/L (ref 21–32)
CREAT SERPL-MCNC: 0.87 MG/DL (ref 0.55–1.02)
DACRYOCYTES BLD QL SMEAR: ABNORMAL
DIFFERENTIAL METHOD BLD: ABNORMAL
EGFR (NO RACE VARIABLE) (RUSH/TITUS): 84 ML/MIN/1.73M2
EOSINOPHIL # BLD AUTO: 0.16 K/UL (ref 0–0.5)
EOSINOPHIL NFR BLD AUTO: 2.6 % (ref 1–4)
EOSINOPHIL NFR BLD MANUAL: 3 % (ref 1–4)
ERYTHROCYTE [DISTWIDTH] IN BLOOD BY AUTOMATED COUNT: 18 % (ref 11.5–14.5)
ERYTHROCYTE [SEDIMENTATION RATE] IN BLOOD BY WESTERGREN METHOD: 13 MM/HR (ref 0–20)
GLUCOSE SERPL-MCNC: 127 MG/DL (ref 74–106)
GLUCOSE SERPL-MCNC: 131 MG/DL (ref 70–105)
GLUCOSE SERPL-MCNC: 155 MG/DL (ref 70–105)
GLUCOSE SERPL-MCNC: 180 MG/DL (ref 70–105)
GLUCOSE SERPL-MCNC: 206 MG/DL (ref 70–105)
HCT VFR BLD AUTO: 32.5 % (ref 38–47)
HGB BLD-MCNC: 9.5 G/DL (ref 12–16)
HYPOCHROMIA BLD QL SMEAR: ABNORMAL
IMM GRANULOCYTES # BLD AUTO: 0.06 K/UL (ref 0–0.04)
IMM GRANULOCYTES NFR BLD: 1 % (ref 0–0.4)
LYMPHOCYTES # BLD AUTO: 1.86 K/UL (ref 1–4.8)
LYMPHOCYTES NFR BLD AUTO: 30.7 % (ref 27–41)
LYMPHOCYTES NFR BLD MANUAL: 29 % (ref 27–41)
MCH RBC QN AUTO: 23.5 PG (ref 27–31)
MCHC RBC AUTO-ENTMCNC: 29.2 G/DL (ref 32–36)
MCV RBC AUTO: 80.2 FL (ref 80–96)
MONOCYTES # BLD AUTO: 0.58 K/UL (ref 0–0.8)
MONOCYTES NFR BLD AUTO: 9.6 % (ref 2–6)
MONOCYTES NFR BLD MANUAL: 7 % (ref 2–6)
MPC BLD CALC-MCNC: 11.4 FL (ref 9.4–12.4)
NEUTROPHILS # BLD AUTO: 3.38 K/UL (ref 1.8–7.7)
NEUTROPHILS NFR BLD AUTO: 55.9 % (ref 53–65)
NEUTS SEG NFR BLD MANUAL: 61 % (ref 50–62)
NRBC # BLD AUTO: 0 X10E3/UL
NRBC BLD MANUAL-RTO: 1 /100 WBC
NRBC, AUTO (.00): 0 %
OVALOCYTES BLD QL SMEAR: ABNORMAL
PLATELET # BLD AUTO: 256 K/UL (ref 150–400)
PLATELET MORPHOLOGY: NORMAL
POLYCHROMASIA BLD QL SMEAR: ABNORMAL
POTASSIUM SERPL-SCNC: 3.8 MMOL/L (ref 3.5–5.1)
RBC # BLD AUTO: 4.05 M/UL (ref 4.2–5.4)
SODIUM SERPL-SCNC: 144 MMOL/L (ref 136–145)
TROPONIN I SERPL DL<=0.01 NG/ML-MCNC: 55.1 PG/ML
WBC # BLD AUTO: 6.05 K/UL (ref 4.5–11)

## 2024-04-21 PROCEDURE — 94640 AIRWAY INHALATION TREATMENT: CPT

## 2024-04-21 PROCEDURE — 85025 COMPLETE CBC W/AUTO DIFF WBC: CPT | Performed by: HOSPITALIST

## 2024-04-21 PROCEDURE — 84484 ASSAY OF TROPONIN QUANT: CPT | Performed by: HOSPITALIST

## 2024-04-21 PROCEDURE — 11000001 HC ACUTE MED/SURG PRIVATE ROOM

## 2024-04-21 PROCEDURE — 82962 GLUCOSE BLOOD TEST: CPT

## 2024-04-21 PROCEDURE — 99900035 HC TECH TIME PER 15 MIN (STAT)

## 2024-04-21 PROCEDURE — 63600175 PHARM REV CODE 636 W HCPCS: Performed by: HOSPITALIST

## 2024-04-21 PROCEDURE — 94660 CPAP INITIATION&MGMT: CPT

## 2024-04-21 PROCEDURE — 80048 BASIC METABOLIC PNL TOTAL CA: CPT | Performed by: HOSPITALIST

## 2024-04-21 PROCEDURE — 25000003 PHARM REV CODE 250: Performed by: INTERNAL MEDICINE

## 2024-04-21 PROCEDURE — 5A09357 ASSISTANCE WITH RESPIRATORY VENTILATION, LESS THAN 24 CONSECUTIVE HOURS, CONTINUOUS POSITIVE AIRWAY PRESSURE: ICD-10-PCS | Performed by: INTERNAL MEDICINE

## 2024-04-21 PROCEDURE — 85651 RBC SED RATE NONAUTOMATED: CPT | Performed by: HOSPITALIST

## 2024-04-21 PROCEDURE — 99232 SBSQ HOSP IP/OBS MODERATE 35: CPT | Mod: ,,, | Performed by: INTERNAL MEDICINE

## 2024-04-21 PROCEDURE — 94761 N-INVAS EAR/PLS OXIMETRY MLT: CPT

## 2024-04-21 PROCEDURE — 99223 1ST HOSP IP/OBS HIGH 75: CPT | Mod: ,,, | Performed by: INTERNAL MEDICINE

## 2024-04-21 PROCEDURE — 27000221 HC OXYGEN, UP TO 24 HOURS

## 2024-04-21 PROCEDURE — 25000242 PHARM REV CODE 250 ALT 637 W/ HCPCS: Performed by: HOSPITALIST

## 2024-04-21 PROCEDURE — 25000003 PHARM REV CODE 250: Performed by: HOSPITALIST

## 2024-04-21 RX ORDER — NAPROXEN SODIUM 220 MG/1
81 TABLET, FILM COATED ORAL DAILY
Status: DISCONTINUED | OUTPATIENT
Start: 2024-04-21 | End: 2024-04-22 | Stop reason: HOSPADM

## 2024-04-21 RX ORDER — GLUCAGON 1 MG
1 KIT INJECTION
Status: DISCONTINUED | OUTPATIENT
Start: 2024-04-21 | End: 2024-04-22 | Stop reason: HOSPADM

## 2024-04-21 RX ORDER — IBUPROFEN 200 MG
24 TABLET ORAL
Status: DISCONTINUED | OUTPATIENT
Start: 2024-04-21 | End: 2024-04-22 | Stop reason: HOSPADM

## 2024-04-21 RX ORDER — INSULIN ASPART 100 [IU]/ML
0-5 INJECTION, SOLUTION INTRAVENOUS; SUBCUTANEOUS
Status: DISCONTINUED | OUTPATIENT
Start: 2024-04-21 | End: 2024-04-22 | Stop reason: HOSPADM

## 2024-04-21 RX ORDER — ATORVASTATIN CALCIUM 40 MG/1
40 TABLET, FILM COATED ORAL NIGHTLY
Status: DISCONTINUED | OUTPATIENT
Start: 2024-04-21 | End: 2024-04-22 | Stop reason: HOSPADM

## 2024-04-21 RX ORDER — DAPAGLIFLOZIN 10 MG/1
10 TABLET, FILM COATED ORAL DAILY
Status: DISCONTINUED | OUTPATIENT
Start: 2024-04-21 | End: 2024-04-22 | Stop reason: HOSPADM

## 2024-04-21 RX ORDER — IBUPROFEN 200 MG
16 TABLET ORAL
Status: DISCONTINUED | OUTPATIENT
Start: 2024-04-21 | End: 2024-04-22 | Stop reason: HOSPADM

## 2024-04-21 RX ADMIN — APIXABAN 2.5 MG: 2.5 TABLET, FILM COATED ORAL at 08:04

## 2024-04-21 RX ADMIN — LEVALBUTEROL HYDROCHLORIDE 1.25 MG: 1.25 SOLUTION RESPIRATORY (INHALATION) at 07:04

## 2024-04-21 RX ADMIN — CARVEDILOL 12.5 MG: 12.5 TABLET, FILM COATED ORAL at 09:04

## 2024-04-21 RX ADMIN — DIPHENHYDRAMINE HYDROCHLORIDE 50 MG: 50 INJECTION INTRAMUSCULAR; INTRAVENOUS at 06:04

## 2024-04-21 RX ADMIN — CARVEDILOL 12.5 MG: 12.5 TABLET, FILM COATED ORAL at 08:04

## 2024-04-21 RX ADMIN — ASPIRIN 81 MG CHEWABLE TABLET 81 MG: 81 TABLET CHEWABLE at 03:04

## 2024-04-21 RX ADMIN — METOCLOPRAMIDE 10 MG: 5 INJECTION, SOLUTION INTRAMUSCULAR; INTRAVENOUS at 06:04

## 2024-04-21 RX ADMIN — BUDESONIDE INHALATION 0.5 MG: 0.5 SUSPENSION RESPIRATORY (INHALATION) at 07:04

## 2024-04-21 RX ADMIN — BISACODYL 10 MG: 5 TABLET, COATED ORAL at 08:04

## 2024-04-21 RX ADMIN — HYDROCHLOROTHIAZIDE 12.5 MG: 12.5 TABLET ORAL at 09:04

## 2024-04-21 RX ADMIN — ATORVASTATIN CALCIUM 40 MG: 40 TABLET, FILM COATED ORAL at 08:04

## 2024-04-21 RX ADMIN — APIXABAN 2.5 MG: 2.5 TABLET, FILM COATED ORAL at 09:04

## 2024-04-21 RX ADMIN — FUROSEMIDE 40 MG: 10 INJECTION, SOLUTION INTRAVENOUS at 09:04

## 2024-04-21 RX ADMIN — LOSARTAN POTASSIUM 50 MG: 50 TABLET, FILM COATED ORAL at 09:04

## 2024-04-21 RX ADMIN — FUROSEMIDE 40 MG: 10 INJECTION, SOLUTION INTRAVENOUS at 08:04

## 2024-04-21 RX ADMIN — ACETAMINOPHEN 1000 MG: 500 TABLET ORAL at 08:04

## 2024-04-21 RX ADMIN — DIPHENHYDRAMINE HYDROCHLORIDE 50 MG: 50 INJECTION INTRAMUSCULAR; INTRAVENOUS at 11:04

## 2024-04-21 RX ADMIN — METOCLOPRAMIDE 10 MG: 5 INJECTION, SOLUTION INTRAMUSCULAR; INTRAVENOUS at 11:04

## 2024-04-21 RX ADMIN — DAPAGLIFLOZIN 10 MG: 10 TABLET, FILM COATED ORAL at 09:04

## 2024-04-21 NOTE — SUBJECTIVE & OBJECTIVE
Past Medical History:   Diagnosis Date    Asthma     Essential (primary) hypertension     Moderate mitral regurgitation by prior echocardiogram     Sleep apnea        Past Surgical History:   Procedure Laterality Date     SECTION      LEFT HEART CATHETERIZATION Right 2021    Procedure: Left heart cath;  Surgeon: Isaías Darden MD;  Location: [facility] CATH LAB;  Service: Cardiology;  Laterality: Right;       Review of patient's allergies indicates:  No Known Allergies    Current Facility-Administered Medications   Medication Dose Route Frequency Provider Last Rate Last Admin    acetaminophen tablet 1,000 mg  1,000 mg Oral Q6H PRN Sahara Pillai MD   1,000 mg at 24 1134    apixaban tablet 2.5 mg  2.5 mg Oral BID Sahara Pillai MD   2.5 mg at 24 0930    bisacodyL EC tablet 10 mg  10 mg Oral Daily PRN Sahara Pillai MD        budesonide nebulizer solution 0.5 mg  0.5 mg Nebulization Q12H Sahara Pillai MD   0.5 mg at 24 0750    carvediloL tablet 12.5 mg  12.5 mg Oral BID Sahara Pillai MD   12.5 mg at 24 0930    dapagliflozin propanediol (Farxiga) tablet 10 mg  10 mg Oral Daily Rylan Antony MD   10 mg at 24 0930    dextromethorphan-guaiFENesin  mg/5 ml liquid 10 mL  10 mL Oral Q6H PRN Sahara Pillai MD        diphenhydrAMINE injection 50 mg  50 mg Intravenous Q6H Sahara Pillai MD   50 mg at 24 0635    furosemide injection 40 mg  40 mg Intravenous Q12H Sahara Pillai MD   40 mg at 24 0930    losartan tablet 50 mg  50 mg Oral Daily Naldo Francois MD   50 mg at 24 0930    And    hydroCHLOROthiazide tablet 12.5 mg  12.5 mg Oral Daily Naldo Francois MD   12.5 mg at 24 0930    levalbuterol nebulizer solution 1.25 mg  1.25 mg Nebulization Q12H Sahara Pillai MD   1.25 mg at 24 0740    melatonin tablet 6 mg  6 mg Oral Nightly PRN Sahara Pillai MD        metoclopramide  injection 10 mg  10 mg Intravenous Q6H Sahara Pillai MD   10 mg at 04/21/24 0635    nitroGLYCERIN SL tablet 0.4 mg  0.4 mg Sublingual Q5 Min PRN Sahara Pillai MD        ondansetron injection 8 mg  8 mg Intravenous Q6H PRN Sahara Pillai MD        simethicone chewable tablet 80 mg  1 tablet Oral TID PRN Sahara Pillai MD        traZODone tablet 50 mg  50 mg Oral Nightly PRN Sahara Pillai MD         Family History       Problem Relation (Age of Onset)    Diabetes Mother, Father    Heart disease Mother          Tobacco Use    Smoking status: Light Smoker    Smokeless tobacco: Not on file   Substance and Sexual Activity    Alcohol use: Not on file    Drug use: Not Currently    Sexual activity: Not on file     Review of Systems   Constitutional: Positive for malaise/fatigue and weight gain. Negative for diaphoresis and night sweats.        Has gained over forty pounds over last six months.    HENT:  Negative for congestion, ear pain, hearing loss, nosebleeds and sore throat.    Eyes:  Negative for blurred vision, double vision, pain, photophobia and visual disturbance.   Cardiovascular:  Positive for chest pain, dyspnea on exertion, irregular heartbeat and palpitations. Negative for claudication, leg swelling, near-syncope, orthopnea and syncope.   Respiratory:  Positive for shortness of breath, sleep disturbances due to breathing and wheezing. Negative for cough and snoring.    Endocrine: Negative for cold intolerance, heat intolerance, polydipsia, polyphagia and polyuria.   Hematologic/Lymphatic: Negative for bleeding problem. Does not bruise/bleed easily.   Skin:  Negative for dry skin, flushing, itching, rash and skin cancer.   Musculoskeletal:  Positive for back pain, joint pain and muscle weakness. Negative for arthritis, falls, muscle cramps and myalgias.   Gastrointestinal:  Positive for heartburn. Negative for abdominal pain, change in bowel habit, constipation, diarrhea,  dysphagia, nausea and vomiting.   Genitourinary:  Negative for bladder incontinence, dysuria, flank pain, frequency and nocturia.   Neurological:  Positive for dizziness and headaches. Negative for focal weakness, light-headedness, loss of balance, numbness, paresthesias and seizures.   Psychiatric/Behavioral:  Positive for depression. Negative for memory loss and substance abuse. The patient is not nervous/anxious.    Allergic/Immunologic: Negative for environmental allergies.   Objective:     Vital Signs (Most Recent):  Temp: 97.9 °F (36.6 °C) (04/21/24 0620)  Pulse: 78 (04/21/24 0750)  Resp: 20 (04/21/24 0750)  BP: 138/73 (04/21/24 0620)  SpO2: 100 % (04/21/24 0740) Vital Signs (24h Range):  Temp:  [97.8 °F (36.6 °C)-98.2 °F (36.8 °C)] 97.9 °F (36.6 °C)  Pulse:  [61-81] 78  Resp:  [18-20] 20  SpO2:  [97 %-100 %] 100 %  BP: (121-152)/(73-86) 138/73     Weight: (!) 176.4 kg (389 lb)  Body mass index is 54.25 kg/m².    SpO2: 100 %         Intake/Output Summary (Last 24 hours) at 4/21/2024 0943  Last data filed at 4/21/2024 0645  Gross per 24 hour   Intake 480 ml   Output --   Net 480 ml       Lines/Drains/Airways       Peripheral Intravenous Line  Duration                  Peripheral IV - Single Lumen 04/19/24 1615 20 G Right Antecubital 1 day                     Physical Exam  Vitals and nursing note reviewed.   Constitutional:       Appearance: Normal appearance. She is obese.   HENT:      Head: Normocephalic.   Eyes:      General: No scleral icterus.        Right eye: No discharge.         Left eye: No discharge.      Extraocular Movements: Extraocular movements intact.      Conjunctiva/sclera: Conjunctivae normal.      Pupils: Pupils are equal, round, and reactive to light.   Cardiovascular:      Rate and Rhythm: Normal rate and regular rhythm.      Pulses: Normal pulses.      Heart sounds: Normal heart sounds. No murmur heard.     No friction rub. No gallop.   Pulmonary:      Effort: Pulmonary effort is normal.       Breath sounds: Normal breath sounds. No wheezing, rhonchi or rales.   Chest:      Chest wall: No tenderness.   Abdominal:      General: Abdomen is flat. Bowel sounds are normal. There is no distension.      Palpations: Abdomen is soft.      Tenderness: There is abdominal tenderness. There is no guarding or rebound.   Musculoskeletal:         General: No swelling or tenderness.      Right lower leg: Edema present.      Left lower leg: Edema present.   Skin:     General: Skin is warm and dry.      Findings: No erythema or rash.   Neurological:      Mental Status: She is alert and oriented to person, place, and time.      Cranial Nerves: No cranial nerve deficit.      Motor: No weakness.      Gait: Gait normal.      Significant Labs:   Recent Lab Results         04/21/24  0619   04/21/24  0436        SED RATE (WESTERGREN)   13       Anion Gap   7       Aniso   1+       Atypical Lymphocytes   Few       Baso #   0.01       Basophil %   0.2       BUN   19       BUN/CREAT RATIO   22       Calcium   8.5       Chloride   110       CO2   31       Creatinine   0.87       Differential Method   Manual       eGFR   84       Eos #   0.16       Eos %   2.6          3       Glucose   127       Hematocrit   32.5       Hemoglobin   9.5       Hypo   Few       Immature Grans (Abs)   0.06       Immature Granulocytes   1.0       Lymph #   1.86       Lymph %   30.7          29       MCH   23.5       MCHC   29.2       MCV   80.2       Mono #   0.58       Mono %   9.6          7       MPV   11.4       Neutrophils, Abs   3.38       Neutrophils Relative   55.9       nRBC   1          0.0       NUCLEATED RBC ABSOLUTE   0.00       Ovalocytes   Few       PLATELET MORPHOLOGY   Normal       Platelet Count   256       POC Glucose 180         Poly   Few       Potassium   3.8       RBC   4.05       RDW   18.0       Segmented Neutrophils, Man %   61       Sodium   144       Teardrop Cells   Few       Troponin I High Sensitivity   55.1       WBC    6.05               Significant Imaging: Echocardiogram: Transthoracic echo (TTE) complete (Cupid Only):   Results for orders placed or performed during the hospital encounter of 04/19/24   Echo   Result Value Ref Range    BSA 2.97 m2    LVOT stroke volume 110.31 cm3    LVIDd 4.17 3.5 - 6.0 cm    LV Systolic Volume 24.87 mL    LV Systolic Volume Index 8.9 mL/m2    LVIDs 2.61 2.1 - 4.0 cm    LV Diastolic Volume 77.31 mL    LV Diastolic Volume Index 27.61 mL/m2    IVS 1.81 (A) 0.6 - 1.1 cm    LVOT diameter 2.03 cm    LVOT area 3.2 cm2    FS 37 28 - 44 %    Left Ventricle Relative Wall Thickness 0.88 cm    Posterior Wall 1.83 (A) 0.6 - 1.1 cm    LV mass 336.62 g    LV Mass Index 120 g/m2    MV Peak E Fritz 0.92 m/s    TDI LATERAL 0.08 m/s    TDI SEPTAL 0.06 m/s    E/E' ratio 13.14 m/s    MV Peak A Fritz 0.83 m/s    TR Max Fritz 1.20 m/s    E/A ratio 1.11     E wave deceleration time 370.84 msec    LV SEPTAL E/E' RATIO 15.33 m/s    LV LATERAL E/E' RATIO 11.50 m/s    LVOT peak fritz 1.44 m/s    Left Ventricular Outflow Tract Mean Velocity 0.98 cm/s    Left Ventricular Outflow Tract Mean Gradient 4.48 mmHg    Right ventricular length in diastole (apical 4-chamber view) 7.23 cm    RV mid diameter 2.33 cm    TAPSE 2.63 cm    LA volume (mod) 54.91 cm3    LA Volume Index (Mod) 19.6 mL/m2    RA area 14.6 cm2    Right Atrium Volume Systolic 33.27 mL    AV mean gradient 6 mmHg    AV peak gradient 11 mmHg    Ao peak fritz 1.65 m/s    Ao VTI 34.80 cm    LVOT peak VTI 34.10 cm    AV valve area 3.17 cm²    AV Velocity Ratio 0.87     AV index (prosthetic) 0.98     MEET by Velocity Ratio 2.82 cm²    Mr max fritz 2.18 m/s    MV stenosis pressure 1/2 time 107.54 ms    MV valve area p 1/2 method 2.05 cm2    Triscuspid Valve Regurgitation Peak Gradient 6 mmHg    PV PEAK VELOCITY 1.34 m/s    PV peak gradient 7 mmHg    Ao root annulus 2.98 cm    Mean e' 0.07 m/s    ZLVIDS -16.58     ZLVIDD -21.87     AORTIC VALVE CUSP SEPERATION 2.30 cm    Narrative       Left Ventricle: The left ventricle is normal in size. Mildly increased   wall thickness. Septal thickening. There is normal systolic function with   a visually estimated ejection fraction of 65 - 70%. There is normal   diastolic function.    Right Ventricle: Normal right ventricular cavity size. Systolic   function is normal.    Left Atrium: Left atrium is dilated.    Aortic Valve: The aortic valve is a trileaflet valve.    Mitral Valve: There is mild to moderate regurgitation.

## 2024-04-21 NOTE — SUBJECTIVE & OBJECTIVE
Interval History:     Patient notes improvement in her symtpoms overall  Making good uop  Ct scan negaitve for acute IC abnormality, ESR also not elevated.     Review of Systems   Respiratory:  Positive for shortness of breath.    Cardiovascular:  Positive for chest pain.   Gastrointestinal: Negative.      Objective:     Vital Signs (Most Recent):  Temp: 97.5 °F (36.4 °C) (04/21/24 1052)  Pulse: 73 (04/21/24 1052)  Resp: 18 (04/21/24 1052)  BP: 114/62 (04/21/24 1052)  SpO2: 97 % (04/21/24 1052) Vital Signs (24h Range):  Temp:  [97.5 °F (36.4 °C)-98.2 °F (36.8 °C)] 97.5 °F (36.4 °C)  Pulse:  [61-81] 73  Resp:  [18-20] 18  SpO2:  [97 %-100 %] 97 %  BP: (114-152)/(62-86) 114/62     Weight: (!) 176.4 kg (389 lb)  Body mass index is 54.25 kg/m².    Intake/Output Summary (Last 24 hours) at 4/21/2024 1233  Last data filed at 4/21/2024 0645  Gross per 24 hour   Intake 360 ml   Output --   Net 360 ml         Physical Exam  Vitals and nursing note reviewed. Exam conducted with a chaperone present.   Constitutional:       General: She is not in acute distress.     Appearance: She is obese. She is not ill-appearing or toxic-appearing.   HENT:      Head: Atraumatic.      Mouth/Throat:      Mouth: Mucous membranes are moist.      Pharynx: Oropharynx is clear.   Eyes:      Conjunctiva/sclera: Conjunctivae normal.      Pupils: Pupils are equal, round, and reactive to light.   Neck:      Vascular: No carotid bruit.   Cardiovascular:      Rate and Rhythm: Normal rate and regular rhythm.      Pulses: Normal pulses.      Heart sounds: Normal heart sounds.   Pulmonary:      Effort: Pulmonary effort is normal.      Breath sounds: Rales present. No wheezing or rhonchi.   Chest:      Chest wall: No tenderness.   Abdominal:      General: Abdomen is flat. Bowel sounds are normal.      Palpations: Abdomen is soft.   Musculoskeletal:         General: No deformity or signs of injury. Normal range of motion.      Cervical back: Neck supple.       Right lower leg: Edema present.      Left lower leg: Edema present.   Skin:     General: Skin is warm and dry.      Capillary Refill: Capillary refill takes less than 2 seconds.      Coloration: Skin is not jaundiced or pale.      Findings: No bruising, lesion or rash.   Neurological:      General: No focal deficit present.      Mental Status: She is alert and oriented to person, place, and time.   Psychiatric:         Mood and Affect: Mood normal.             Significant Labs: All pertinent labs within the past 24 hours have been reviewed.    Significant Imaging: I have reviewed all pertinent imaging results/findings within the past 24 hours.

## 2024-04-21 NOTE — ASSESSMENT & PLAN NOTE
Patient is identified as having Diastolic (HFpEF) heart failure that is Acute on chronic. CHF is currently controlled. Latest ECHO performed and demonstrates- Results for orders placed during the hospital encounter of 04/19/24    Echo    Interpretation Summary    Left Ventricle: The left ventricle is normal in size. Mildly increased wall thickness. Septal thickening. There is normal systolic function with a visually estimated ejection fraction of 65 - 70%. There is normal diastolic function.    Right Ventricle: Normal right ventricular cavity size. Systolic function is normal.    Left Atrium: Left atrium is dilated.    Aortic Valve: The aortic valve is a trileaflet valve.    Mitral Valve: There is mild to moderate regurgitation.  . Continue Beta Blocker, ACE/ARB, and Nitrate/Vasodilator and monitor clinical status closely. Monitor on telemetry. Patient is off CHF pathway.  Monitor strict Is&Os and daily weights.  Place on fluid restriction of 2 L. Cardiology has been consulted. Continue to stress to patient importance of self efficacy and  on diet for CHF. Last BNP reviewed- and noted below

## 2024-04-21 NOTE — PLAN OF CARE
"[facility] Telemetry  Initial Discharge Assessment       Primary Care Provider: Mildred Castillo NP    Admission Diagnosis: Demand ischemia of myocardium [I24.89]  Chest pain [R07.9]  Acute on chronic diastolic (congestive) heart failure [I50.33]    Admission Date: 4/19/2024  Expected Discharge Date:     Transition of Care Barriers: (P) None    Payor: MEDICAID [place] / Plan: [place]ICA[place] [facility] [facility] [place] / Product Type: Managed Medicaid /     No emergency contact information on file.    Discharge Plan A: (P) Home with family, Home  Discharge Plan B: (P) Home, Home with family      [facility] [place] [address]  [address]  [place] [address]  Phone: [phone] Fax: [phone]    [id number] - [place], [place] - [address] AT [facility] [address]  [address]  [place] [address]  Phone: [phone] Fax: [phone]      Initial Assessment (most recent)       Adult Discharge Assessment - 04/21/24 0944          Discharge Assessment    Confirmed/corrected address, phone number and insurance Yes (P)      Confirmed Demographics Correct on Facesheet (P)      Source of Information patient (P)      Communicated SHERRY with patient/caregiver Date not available/Unable to determine (P)      People in Home child(jaclyn), dependent (P)    Pt states that she has 7 children.    Do you expect to return to your current living situation? Yes (P)      Do you have help at home or someone to help you manage your care at home? Yes (P)      Who are your caregiver(s) and their phone number(s)? Amarjit Mario (mother) [phone] (P)      Prior to hospitilization cognitive status: Alert/Oriented (P)      Current cognitive status: Alert/Oriented (P)      Walking or Climbing Stairs Difficulty yes (P)    Pt stated, "I give out of breath easily."    Walking or Climbing Stairs -- (P)    Pt stated that she has difficulty climbing stairs in her " "house, but moreno snot use any equipment to ambulate. "I just give out of breath."    Dressing/Bathing Difficulty no (P)      Do you have any problems with: -- (P)    None voiced by pt.    Home Accessibility stairs within home (P)      Stairs, Within Home, Primary Pt voiced, "There are a lot of stairs in my home. I can't say exactly how many." (P)      Number of Stairs, Within Home, Primary other (see comments) (P)    "I can't say how many. There are a lot."    Number of Stairs, Main Entrance none (P)      Stair Railings, Main Entrance none (P)      Landing, Stairs, Main Entrance railings present (P)      Home Layout Bedroom on 2nd floor (P)      Equipment Currently Used at Home none (P)      Readmission within 30 days? No (P)      Patient currently being followed by outpatient case management? No (P)      Do you currently have service(s) that help you manage your care at home? No (P)      Do you take prescription medications? Yes (P)      Do you have prescription coverage? Yes (P)      Coverage Medicaid (P)      Do you have any problems affording any of your prescribed medications? No (P)      Is the patient taking medications as prescribed? yes (P)      Who is going to help you get home at discharge? Pt stated that her mother will assist. (P)      How do you get to doctors appointments? car, drives self (P)      Are you on dialysis? No (P)      Do you take coumadin? Yes (P)      Discharge Plan A Home with family;Home (P)      Discharge Plan B Home;Home with family (P)      DME Needed Upon Discharge  none (P)      Discharge Plan discussed with: Patient (P)      Transition of Care Barriers None (P)         Physical Activity    On average, how many days per week do you engage in moderate to strenuous exercise (like a brisk walk)? 0 days (P)      On average, how many minutes do you engage in exercise at this level? 0 min (P)         Financial Resource Strain    How hard is it for you to pay for the very basics like food, " housing, medical care, and heating? Not hard at all (P)         Housing Stability    In the last 12 months, was there a time when you were not able to pay the mortgage or rent on time? No (P)      In the past 12 months, how many times have you moved where you were living? 1 (P)      At any time in the past 12 months, were you homeless or living in a shelter (including now)? No (P)         Transportation Needs    In the past 12 months, has lack of transportation kept you from medical appointments or from getting medications? No (P)      In the past 12 months, has lack of transportation kept you from meetings, work, or from getting things needed for daily living? No (P)         Food Insecurity    Within the past 12 months, you worried that your food would run out before you got the money to buy more. Never true (P)      Within the past 12 months, the food you bought just didn't last and you didn't have money to get more. Never true (P)         Stress    Do you feel stress - tense, restless, nervous, or anxious, or unable to sleep at night because your mind is troubled all the time - these days? Only a little (P)         Social Connections    In a typical week, how many times do you talk on the phone with family, friends, or neighbors? More than three times a week (P)    Pt reports daily.    How often do you get together with friends or relatives? More than three times a week (P)    Pt reports daily.    How often do you attend Roman Catholic or Adventist services? Never (P)      Do you belong to any clubs or organizations such as Roman Catholic groups, unions, fraternal or athletic groups, or school groups? No (P)      How often do you attend meetings of the clubs or organizations you belong to? Never (P)      Are you , , , , never , or living with a partner? -- (P)    Pt stated that she is single.       Alcohol Use    Q1: How often do you have a drink containing alcohol? Never (P)      Q2: How  many drinks containing alcohol do you have on a typical day when you are drinking? Patient does not drink (P)      Q3: How often do you have six or more drinks on one occasion? Never (P)                  SW saw pt on 4/20/2024 for initial assessment. Pt was alert and oriented x3. She has healthcare coverage/Medicaid. She stated that she has stairs in her home and has difficulty climbing the stairs due to being out of breath. She does not use a cane or any other device for ambulation. SS will follow up with discharge planning.

## 2024-04-21 NOTE — PROGRESS NOTES
44 yo F admitted last night for CHF and echo showed moderate mitral valve regurge with normal diastolic and systolic dysfunction.  She does have BJ and is compliant with bipap.  She had some hypoxia at admission and this has resolved and states she has had improvement with IV lasix and inhaled steroids and bronchodilators.  Weight not yet recorded today and I/Os incomplete.      Patient states that her main complaint is the headache she has had for over a week.  She does not have a h/o migraines and says that her HA began as a throbbing behind her eyes with no vision change or neuro deficits.  She had tylenol and vistaril last night without any improvement.  Today she had some slurred speech which has resolved and may have been a result of the vistaril and not getting much sleep but will check a CT head.  This is not a chronic HA.  No N/V.      Currently she is alert and oriented x3 and moving all extr with no tremor.    RRR 2/6 MARK in LLCM without radiation.    Rales present but no wheezing or rhonci; breathing appears slightly labored  Patient still has orthopnea and BLE edema but improved.      Problem list is CHF due to valvular heart disease, demand ischemia with BJ on bipap, asthma, primary HTN and headache.      Plan includes bipap with supplemental oxygen as indicated, BP on low normal side will stop CCB and continue ARB/HCT and BB and will stop lasix if BP continues to be low but clinically appears to be volume overloaded.  Will check AM labs and another trop.  Trend is flat but cardiology has been consulted.  Continue inhaled steroids/bronchodilators.  Follow results of CT and start IV benadryl/reglan and follow clinically.  May need LP to measure opening pressure for idiopathic intracranial hypertension which is in the diagnosis.  Due to body habitus recommend under fluoroscopy.  Will check ESR to r/o vasculitis.      Will monitor on telemetry and change DVT propylaxis to eliquis 2.5 mg BID due to SQ  lovenox limited absorbability due to body habitus and will follow fasting lipid panel.

## 2024-04-21 NOTE — ASSESSMENT & PLAN NOTE
"Patient is identified as having Diastolic (HFpEF) heart failure that is Acute on chronic. CHF is currently uncontrolled due to Continued edema of extremities. Latest ECHO performed and demonstrates- Results for orders placed during the hospital encounter of 04/19/24    Echo    Interpretation Summary    Left Ventricle: The left ventricle is normal in size. Mildly increased wall thickness. Septal thickening. There is normal systolic function with a visually estimated ejection fraction of 65 - 70%. There is normal diastolic function.    Right Ventricle: Normal right ventricular cavity size. Systolic function is normal.    Left Atrium: Left atrium is dilated.    Aortic Valve: The aortic valve is a trileaflet valve.    Mitral Valve: There is mild to moderate regurgitation.  . Continue Furosemide and monitor clinical status closely. Monitor on telemetry. Patient is off CHF pathway.  Monitor strict Is&Os and daily weights.  Place on fluid restriction of 1.5 L. Cardiology has been consulted. Continue to stress to patient importance of self efficacy and  on diet for CHF. Last BNP reviewed- and noted below No results for input(s): "BNP", "BNPTRIAGEBLO" in the last 168 hours.  "

## 2024-04-21 NOTE — CONSULTS
[facility] - [address] [place] [address] Telemetry  Cardiology  Consult Note    Patient Name: Padmini Gaspar  MRN: [id number]  Admission Date: 2024  Hospital Length of Stay: 2 days  Code Status: Full Code   Attending Provider: Rylan Antony MD   Consulting Provider: Misael Jacobson DO  Primary Care Physician: Mildred Castillo NP  Principal Problem:Acute CHF    Patient information was obtained from patient, past medical records, and ER records.     Consults  Subjective:     Chief Complaint:  chest pain, palpitations.      HPI:   Cc:Shortness of breath, chest pain    PT complains of left sided chest pain, across underside of left breast, 8/10 at most severe.  Chest pain comes and goes spontaneously, is associated with shortness of breath, lasts from seconds to fifteen minutes.  She notes shortness of breath with minimal activity, usually improves with use of MDI.  She also reports increasing lower extremity edema, bilat, worsening over last several weeks.  She was not using CPAP for the last several months, did start using consistently last two days before admission. She has also experienced palpitations, feels like her heart starts beating fast and irregular, comes and goes spontaneously, lasts from seconds up to five minutes.  Pt notes she underwent extensive cardiac eval , including [facility], , which demonstrated normal coronary arteries, and normal LV systolic function.         Past Medical History:   Diagnosis Date    Asthma     Essential (primary) hypertension     Moderate mitral regurgitation by prior echocardiogram     Sleep apnea        Past Surgical History:   Procedure Laterality Date     SECTION      LEFT HEART CATHETERIZATION Right 2021    Procedure: Left heart cath;  Surgeon: Isaías Darden MD;  Location: [facility] CATH LAB;  Service: Cardiology;  Laterality: Right;       Review of patient's allergies indicates:  No Known Allergies    Current Facility-Administered Medications   Medication  Dose Route Frequency Provider Last Rate Last Admin    acetaminophen tablet 1,000 mg  1,000 mg Oral Q6H PRN Sahara Pillai MD   1,000 mg at 04/20/24 1134    apixaban tablet 2.5 mg  2.5 mg Oral BID Sahara Pillai MD   2.5 mg at 04/21/24 0930    bisacodyL EC tablet 10 mg  10 mg Oral Daily PRN Sahara Pillai MD        budesonide nebulizer solution 0.5 mg  0.5 mg Nebulization Q12H Sahara Pillai MD   0.5 mg at 04/21/24 0750    carvediloL tablet 12.5 mg  12.5 mg Oral BID Sahara Pillai MD   12.5 mg at 04/21/24 0930    dapagliflozin propanediol (Farxiga) tablet 10 mg  10 mg Oral Daily Rylan Antony MD   10 mg at 04/21/24 0930    dextromethorphan-guaiFENesin  mg/5 ml liquid 10 mL  10 mL Oral Q6H PRN Sahara Pillai MD        diphenhydrAMINE injection 50 mg  50 mg Intravenous Q6H Sahara Pillai MD   50 mg at 04/21/24 0635    furosemide injection 40 mg  40 mg Intravenous Q12H Sahara Pillai MD   40 mg at 04/21/24 0930    losartan tablet 50 mg  50 mg Oral Daily Naldo Francois MD   50 mg at 04/21/24 0930    And    hydroCHLOROthiazide tablet 12.5 mg  12.5 mg Oral Daily Naldo Francois MD   12.5 mg at 04/21/24 0930    levalbuterol nebulizer solution 1.25 mg  1.25 mg Nebulization Q12H Sahara Pillai MD   1.25 mg at 04/21/24 0740    melatonin tablet 6 mg  6 mg Oral Nightly PRN Sahara Pillai MD        metoclopramide injection 10 mg  10 mg Intravenous Q6H Sahara Pillai MD   10 mg at 04/21/24 0635    nitroGLYCERIN SL tablet 0.4 mg  0.4 mg Sublingual Q5 Min PRN Sahara Pillai MD        ondansetron injection 8 mg  8 mg Intravenous Q6H PRN Sahara Pillai MD        simethicone chewable tablet 80 mg  1 tablet Oral TID PRN Sahara Pillai MD        traZODone tablet 50 mg  50 mg Oral Nightly PRN Sahara Pillai MD         Family History       Problem Relation (Age of Onset)    Diabetes Mother, Father    Heart disease Mother           Tobacco Use    Smoking status: Light Smoker    Smokeless tobacco: Not on file   Substance and Sexual Activity    Alcohol use: Not on file    Drug use: Not Currently    Sexual activity: Not on file     Review of Systems   Constitutional: Positive for malaise/fatigue and weight gain. Negative for diaphoresis and night sweats.        Has gained over forty pounds over last six months.    HENT:  Negative for congestion, ear pain, hearing loss, nosebleeds and sore throat.    Eyes:  Negative for blurred vision, double vision, pain, photophobia and visual disturbance.   Cardiovascular:  Positive for chest pain, dyspnea on exertion, irregular heartbeat and palpitations. Negative for claudication, leg swelling, near-syncope, orthopnea and syncope.   Respiratory:  Positive for shortness of breath, sleep disturbances due to breathing and wheezing. Negative for cough and snoring.    Endocrine: Negative for cold intolerance, heat intolerance, polydipsia, polyphagia and polyuria.   Hematologic/Lymphatic: Negative for bleeding problem. Does not bruise/bleed easily.   Skin:  Negative for dry skin, flushing, itching, rash and skin cancer.   Musculoskeletal:  Positive for back pain, joint pain and muscle weakness. Negative for arthritis, falls, muscle cramps and myalgias.   Gastrointestinal:  Positive for heartburn. Negative for abdominal pain, change in bowel habit, constipation, diarrhea, dysphagia, nausea and vomiting.   Genitourinary:  Negative for bladder incontinence, dysuria, flank pain, frequency and nocturia.   Neurological:  Positive for dizziness and headaches. Negative for focal weakness, light-headedness, loss of balance, numbness, paresthesias and seizures.   Psychiatric/Behavioral:  Positive for depression. Negative for memory loss and substance abuse. The patient is not nervous/anxious.    Allergic/Immunologic: Negative for environmental allergies.   Objective:     Vital Signs (Most Recent):  Temp: 97.9 °F (36.6  °C) (04/21/24 0620)  Pulse: 78 (04/21/24 0750)  Resp: 20 (04/21/24 0750)  BP: 138/73 (04/21/24 0620)  SpO2: 100 % (04/21/24 0740) Vital Signs (24h Range):  Temp:  [97.8 °F (36.6 °C)-98.2 °F (36.8 °C)] 97.9 °F (36.6 °C)  Pulse:  [61-81] 78  Resp:  [18-20] 20  SpO2:  [97 %-100 %] 100 %  BP: (121-152)/(73-86) 138/73     Weight: (!) 176.4 kg (389 lb)  Body mass index is 54.25 kg/m².    SpO2: 100 %         Intake/Output Summary (Last 24 hours) at 4/21/2024 0918  Last data filed at 4/21/2024 0645  Gross per 24 hour   Intake 480 ml   Output --   Net 480 ml       Lines/Drains/Airways       Peripheral Intravenous Line  Duration                  Peripheral IV - Single Lumen 04/19/24 1615 20 G Right Antecubital 1 day                     Physical Exam  Vitals and nursing note reviewed.   Constitutional:       Appearance: Normal appearance. She is obese.   HENT:      Head: Normocephalic.   Eyes:      General: No scleral icterus.        Right eye: No discharge.         Left eye: No discharge.      Extraocular Movements: Extraocular movements intact.      Conjunctiva/sclera: Conjunctivae normal.      Pupils: Pupils are equal, round, and reactive to light.   Cardiovascular:      Rate and Rhythm: Normal rate and regular rhythm.      Pulses: Normal pulses.      Heart sounds: Normal heart sounds. No murmur heard.     No friction rub. No gallop.   Pulmonary:      Effort: Pulmonary effort is normal.      Breath sounds: Normal breath sounds. No wheezing, rhonchi or rales.   Chest:      Chest wall: No tenderness.   Abdominal:      General: Abdomen is flat. Bowel sounds are normal. There is no distension.      Palpations: Abdomen is soft.      Tenderness: There is abdominal tenderness. There is no guarding or rebound.   Musculoskeletal:         General: No swelling or tenderness.      Right lower leg: Edema present.      Left lower leg: Edema present.   Skin:     General: Skin is warm and dry.      Findings: No erythema or rash.    Neurological:      Mental Status: She is alert and oriented to person, place, and time.      Cranial Nerves: No cranial nerve deficit.      Motor: No weakness.      Gait: Gait normal.      Significant Labs:   Recent Lab Results         04/21/24  0619   04/21/24  0436        SED RATE (WESTERGREN)   13       Anion Gap   7       Aniso   1+       Atypical Lymphocytes   Few       Baso #   0.01       Basophil %   0.2       BUN   19       BUN/CREAT RATIO   22       Calcium   8.5       Chloride   110       CO2   31       Creatinine   0.87       Differential Method   Manual       eGFR   84       Eos #   0.16       Eos %   2.6          3       Glucose   127       Hematocrit   32.5       Hemoglobin   9.5       Hypo   Few       Immature Grans (Abs)   0.06       Immature Granulocytes   1.0       Lymph #   1.86       Lymph %   30.7          29       MCH   23.5       MCHC   29.2       MCV   80.2       Mono #   0.58       Mono %   9.6          7       MPV   11.4       Neutrophils, Abs   3.38       Neutrophils Relative   55.9       nRBC   1          0.0       NUCLEATED RBC ABSOLUTE   0.00       Ovalocytes   Few       PLATELET MORPHOLOGY   Normal       Platelet Count   256       POC Glucose 180         Poly   Few       Potassium   3.8       RBC   4.05       RDW   18.0       Segmented Neutrophils, Man %   61       Sodium   144       Teardrop Cells   Few       Troponin I High Sensitivity   55.1       WBC   6.05               Significant Imaging: Echocardiogram: Transthoracic echo (TTE) complete (Cupid Only):   Results for orders placed or performed during the hospital encounter of 04/19/24   Echo   Result Value Ref Range    BSA 2.97 m2    LVOT stroke volume 110.31 cm3    LVIDd 4.17 3.5 - 6.0 cm    LV Systolic Volume 24.87 mL    LV Systolic Volume Index 8.9 mL/m2    LVIDs 2.61 2.1 - 4.0 cm    LV Diastolic Volume 77.31 mL    LV Diastolic Volume Index 27.61 mL/m2    IVS 1.81 (A) 0.6 - 1.1 cm    LVOT diameter 2.03 cm    LVOT area 3.2 cm2     FS 37 28 - 44 %    Left Ventricle Relative Wall Thickness 0.88 cm    Posterior Wall 1.83 (A) 0.6 - 1.1 cm    LV mass 336.62 g    LV Mass Index 120 g/m2    MV Peak E Fritz 0.92 m/s    TDI LATERAL 0.08 m/s    TDI SEPTAL 0.06 m/s    E/E' ratio 13.14 m/s    MV Peak A Fritz 0.83 m/s    TR Max Fritz 1.20 m/s    E/A ratio 1.11     E wave deceleration time 370.84 msec    LV SEPTAL E/E' RATIO 15.33 m/s    LV LATERAL E/E' RATIO 11.50 m/s    LVOT peak fritz 1.44 m/s    Left Ventricular Outflow Tract Mean Velocity 0.98 cm/s    Left Ventricular Outflow Tract Mean Gradient 4.48 mmHg    Right ventricular length in diastole (apical 4-chamber view) 7.23 cm    RV mid diameter 2.33 cm    TAPSE 2.63 cm    LA volume (mod) 54.91 cm3    LA Volume Index (Mod) 19.6 mL/m2    RA area 14.6 cm2    Right Atrium Volume Systolic 33.27 mL    AV mean gradient 6 mmHg    AV peak gradient 11 mmHg    Ao peak fritz 1.65 m/s    Ao VTI 34.80 cm    LVOT peak VTI 34.10 cm    AV valve area 3.17 cm²    AV Velocity Ratio 0.87     AV index (prosthetic) 0.98     MEET by Velocity Ratio 2.82 cm²    Mr max firtz 2.18 m/s    MV stenosis pressure 1/2 time 107.54 ms    MV valve area p 1/2 method 2.05 cm2    Triscuspid Valve Regurgitation Peak Gradient 6 mmHg    PV PEAK VELOCITY 1.34 m/s    PV peak gradient 7 mmHg    Ao root annulus 2.98 cm    Mean e' 0.07 m/s    ZLVIDS -16.58     ZLVIDD -21.87     AORTIC VALVE CUSP SEPERATION 2.30 cm    Narrative      Left Ventricle: The left ventricle is normal in size. Mildly increased   wall thickness. Septal thickening. There is normal systolic function with   a visually estimated ejection fraction of 65 - 70%. There is normal   diastolic function.    Right Ventricle: Normal right ventricular cavity size. Systolic   function is normal.    Left Atrium: Left atrium is dilated.    Aortic Valve: The aortic valve is a trileaflet valve.    Mitral Valve: There is mild to moderate regurgitation.       Assessment and Plan:     * Acute CHF  Patient is  identified as having Diastolic (HFpEF) heart failure that is Acute on chronic. CHF is currently controlled. Latest ECHO performed and demonstrates- Results for orders placed during the hospital encounter of 04/19/24    Echo    Interpretation Summary    Left Ventricle: The left ventricle is normal in size. Mildly increased wall thickness. Septal thickening. There is normal systolic function with a visually estimated ejection fraction of 65 - 70%. There is normal diastolic function.    Right Ventricle: Normal right ventricular cavity size. Systolic function is normal.    Left Atrium: Left atrium is dilated.    Aortic Valve: The aortic valve is a trileaflet valve.    Mitral Valve: There is mild to moderate regurgitation.  . Continue Beta Blocker, ACE/ARB, and Nitrate/Vasodilator and monitor clinical status closely. Monitor on telemetry. Patient is off CHF pathway.  Monitor strict Is&Os and daily weights.  Place on fluid restriction of 2 L. Cardiology has been consulted. Continue to stress to patient importance of self efficacy and  on diet for CHF. Last BNP reviewed- and noted below     Sleep apnea  Was non-compliant with CPAP for several months, suspect chronic hypoxia conttributing to acute on chronic diastolic heart failure.     Morbid obesity with BMI of 50.0-59.9, adult  Body mass index is 54.25 kg/m². Morbid obesity complicates all aspects of disease management from diagnostic modalities to treatment. Weight loss encouraged and health benefits explained to patient.  Weight increase over forty pounds over last six months due to limitation of activity due to low back pain         Asthma  Controlled on MDIs.           VTE Risk Mitigation (From admission, onward)           Ordered     apixaban tablet 2.5 mg  2 times daily         04/20/24 8363                    Thank you for your consult. I will follow-up with patient. Please contact us if you have any additional questions.    Misael Jacobson, DO  Cardiology    [facility] - [address] [place] [address]

## 2024-04-21 NOTE — HOSPITAL COURSE
PT started on  IV diuresis with rapid improvement in symptoms of shortness of breath and lower extremity edema.

## 2024-04-21 NOTE — ASSESSMENT & PLAN NOTE
Was non-compliant with CPAP for several months, suspect chronic hypoxia conttributing to acute on chronic diastolic heart failure.

## 2024-04-21 NOTE — PROGRESS NOTES
[facility] - [address] [place] [address] Medicine  Progress Note    Patient Name: Padmini Gaspar  MRN: [id number]  Patient Class: IP- Inpatient   Admission Date: 4/19/2024  Length of Stay: 2 days  Attending Physician: Rylan Antony MD  Primary Care Provider: Mildred Castillo NP        Subjective:     Principal Problem:Acute CHF        HPI:  44 yo F presents to [facility] ED with worsening CP and dyspnea.  She has BLE and obvious orthopnea.  She says the CP hurts when she takes deep breath and she does have asthma and has been using her prn inhaler.  She says what began as DE SOUZA is now orthopnea where she has to sit up to breath.  She does use a CPAP at night on setting 10 and this was diagnosed in 2021 when Dr. Darden performed LHC and echo showing no diastolic or systolic function but her RVSP was 30 mmHg at that time showing mild PHTN and norvasc started.      Patient is compliant with meds and has been taking lasix daily.  She says that last week she got a throbbing HA and thought she may be having a migraine but has no h/o migraines.  Has improved with lowering of her BP and she feels better at this time.  She was giving IV lasix in the ED and has had significant UOP.   Not sure if improved BP really has helped with the HA but should be followed.  May be tension from poor sleep but idiopathic intracranial hypertension is always in the differential.  Hopefull will improve with diuresis.      Please see assessment and plan below for detailed problem bases evaluation.      Overview/Hospital Course:  No notes on file    Interval History:     Patient notes improvement in her symtpoms overall  Making good uop  Ct scan negaitve for acute IC abnormality, ESR also not elevated.     Review of Systems   Respiratory:  Positive for shortness of breath.    Cardiovascular:  Positive for chest pain.   Gastrointestinal: Negative.      Objective:     Vital Signs (Most Recent):  Temp: 97.5 °F (36.4 °C) (04/21/24  1052)  Pulse: 73 (04/21/24 1052)  Resp: 18 (04/21/24 1052)  BP: 114/62 (04/21/24 1052)  SpO2: 97 % (04/21/24 1052) Vital Signs (24h Range):  Temp:  [97.5 °F (36.4 °C)-98.2 °F (36.8 °C)] 97.5 °F (36.4 °C)  Pulse:  [61-81] 73  Resp:  [18-20] 18  SpO2:  [97 %-100 %] 97 %  BP: (114-152)/(62-86) 114/62     Weight: (!) 176.4 kg (389 lb)  Body mass index is 54.25 kg/m².    Intake/Output Summary (Last 24 hours) at 4/21/2024 1233  Last data filed at 4/21/2024 0645  Gross per 24 hour   Intake 360 ml   Output --   Net 360 ml         Physical Exam  Vitals and nursing note reviewed. Exam conducted with a chaperone present.   Constitutional:       General: She is not in acute distress.     Appearance: She is obese. She is not ill-appearing or toxic-appearing.   HENT:      Head: Atraumatic.      Mouth/Throat:      Mouth: Mucous membranes are moist.      Pharynx: Oropharynx is clear.   Eyes:      Conjunctiva/sclera: Conjunctivae normal.      Pupils: Pupils are equal, round, and reactive to light.   Neck:      Vascular: No carotid bruit.   Cardiovascular:      Rate and Rhythm: Normal rate and regular rhythm.      Pulses: Normal pulses.      Heart sounds: Normal heart sounds.   Pulmonary:      Effort: Pulmonary effort is normal.      Breath sounds: Rales present. No wheezing or rhonchi.   Chest:      Chest wall: No tenderness.   Abdominal:      General: Abdomen is flat. Bowel sounds are normal.      Palpations: Abdomen is soft.   Musculoskeletal:         General: No deformity or signs of injury. Normal range of motion.      Cervical back: Neck supple.      Right lower leg: Edema present.      Left lower leg: Edema present.   Skin:     General: Skin is warm and dry.      Capillary Refill: Capillary refill takes less than 2 seconds.      Coloration: Skin is not jaundiced or pale.      Findings: No bruising, lesion or rash.   Neurological:      General: No focal deficit present.      Mental Status: She is alert and oriented to person,  "place, and time.   Psychiatric:         Mood and Affect: Mood normal.             Significant Labs: All pertinent labs within the past 24 hours have been reviewed.    Significant Imaging: I have reviewed all pertinent imaging results/findings within the past 24 hours.    Assessment/Plan:      * Acute CHF  Patient is identified as having  unknown  heart failure that is Acute. CHF is currently uncontrolled due to Continued edema of extremities, Dyspnea not returned to baseline after 40 mg doses of IV diuretic, >3 pillow orthopnea, and Rales/crackles on pulmonary exam. Latest ECHO performed and demonstrates- Results for orders placed during the hospital encounter of 07/11/21    Echo    Interpretation Summary  · The left ventricle is normal in size with moderate concentric hypertrophy and normal systolic function.  · The estimated ejection fraction is 65%.  · Normal left ventricular diastolic function.  · Mild right ventricular enlargement with normal right ventricular systolic function.  · Mild tricuspid regurgitation.  · Normal central venous pressure (3 mmHg).  · The estimated PA systolic pressure is 30 mmHg.  . Continue Beta Blocker, ACE/ARB, Furosemide, and Nitrate/Vasodilator and monitor clinical status closely. Monitor on telemetry. Patient is on CHF pathway.  Monitor strict Is&Os and daily weights.  Place on fluid restriction of 2 L. Cardiology has been consulted. Continue to stress to patient importance of self efficacy and  on diet for CHF. Last BNP reviewed- and noted below No results for input(s): "BNP", "BNPTRIAGEBLO" in the last 168 hours.    Patient does not use oxygen at home and oxygen saturations are 82% on room air transferring from stretcher to bed.  Patient with ongoing condition that if not treated properly could result in loss of life or bodily function.      Acute on chronic diastolic (congestive) heart failure  Patient is identified as having Diastolic (HFpEF) heart failure that is Acute " "on chronic. CHF is currently uncontrolled due to Continued edema of extremities. Latest ECHO performed and demonstrates- Results for orders placed during the hospital encounter of 04/19/24    Echo    Interpretation Summary    Left Ventricle: The left ventricle is normal in size. Mildly increased wall thickness. Septal thickening. There is normal systolic function with a visually estimated ejection fraction of 65 - 70%. There is normal diastolic function.    Right Ventricle: Normal right ventricular cavity size. Systolic function is normal.    Left Atrium: Left atrium is dilated.    Aortic Valve: The aortic valve is a trileaflet valve.    Mitral Valve: There is mild to moderate regurgitation.  . Continue Furosemide and monitor clinical status closely. Monitor on telemetry. Patient is off CHF pathway.  Monitor strict Is&Os and daily weights.  Place on fluid restriction of 1.5 L. Cardiology has been consulted. Continue to stress to patient importance of self efficacy and  on diet for CHF. Last BNP reviewed- and noted below No results for input(s): "BNP", "BNPTRIAGEBLO" in the last 168 hours.    Acute intractable headache    Pts head ache is better now, will d/c reglan and benadryl  CT head also wnl  Esr negative    Essential (primary) hypertension  Chronic, controlled. Latest blood pressure and vitals reviewed-     Temp:  [97.5 °F (36.4 °C)-97.8 °F (36.6 °C)]   Pulse:  [69-89]   Resp:  [14-26]   BP: (122-158)/(60-84)   SpO2:  [93 %-100 %] .   Home meds for hypertension were reviewed and noted below.   Hypertension Medications               amLODIPine (NORVASC) 10 MG tablet Take 10 mg by mouth once daily.    carvediloL (COREG) 25 MG tablet Take 25 mg by mouth 2 (two) times daily.    furosemide (LASIX) 40 MG tablet Take 40 mg by mouth every morning.    minoxidiL (LONITEN) 10 MG Tab Take 10 mg by mouth every evening.    olmesartan-hydrochlorothiazide (BENICAR HCT) 40-25 mg per tablet Take 1 tablet by mouth once " daily.            While in the hospital, will manage blood pressure as follows; Adjust home antihypertensive regimen as follows- decrease BB in half due to acute CHF.  Stop minoxidil    Will utilize p.r.n. blood pressure medication only if patient's blood pressure greater than 140/90 and she develops symptoms such as worsening chest pain or shortness of breath.    Sleep apnea  Continue home CPAP at 10 cmH2O      Demand ischemia of myocardium  Gave full dose lovenox, high dose brillinta and statin load.  Received NTG, lasix and asa in ED.    No significant delta change in trops but will check third trop to evaluate the trend.    May be from pulmonary hypertension.  2D echo pending.          Morbid obesity with BMI of 50.0-59.9, adult  Body mass index is 54.25 kg/m². Morbid obesity complicates all aspects of disease management from diagnostic modalities to treatment. Weight loss encouraged and health benefits explained to patient.         Asthma  Continue bronchodilator but as scheduled dosing and will add inhaled steroid while in hospital.  If wheezing improves may be able to return to baseline prn bronchodilator as OP prescribed.       However, if patient has PHTN on 2D echo Dr. Razia Ford has requested referral to her pulmonary clinic and would follow the asthma as well.  May need PFTs once acute illness resolved for accurate results.          VTE Risk Mitigation (From admission, onward)           Ordered     apixaban tablet 2.5 mg  2 times daily         04/20/24 5008                    Discharge Planning   SHERRY:      Code Status: Full Code   Is the patient medically ready for discharge?:     Reason for patient still in hospital (select all that apply): Treatment  Discharge Plan A: Home with family, Home                  Rehmat BRISSA Antony MD  Department of Hospital Medicine   [facility] - [address] [facility] Telemetry

## 2024-04-21 NOTE — HPI
Cc:Shortness of breath, chest pain    PT complains of left sided chest pain, across underside of left breast, 8/10 at most severe.  Chest pain comes and goes spontaneously, is associated with shortness of breath, lasts from seconds to fifteen minutes.  She notes shortness of breath with minimal activity, usually improves with use of MDI.  She also reports increasing lower extremity edema, bilat, worsening over last several weeks.  She was not using CPAP for the last several months, did start using consistently last two days before admission. She has also experienced palpitations, feels like her heart starts beating fast and irregular, comes and goes spontaneously, lasts from seconds up to five minutes.  Pt notes she underwent extensive cardiac eval , including [facility], 2021, which demonstrated normal coronary arteries, and normal LV systolic function.

## 2024-04-22 VITALS
RESPIRATION RATE: 20 BRPM | OXYGEN SATURATION: 97 % | HEIGHT: 71 IN | BODY MASS INDEX: 41.02 KG/M2 | HEART RATE: 78 BPM | SYSTOLIC BLOOD PRESSURE: 100 MMHG | TEMPERATURE: 98 F | DIASTOLIC BLOOD PRESSURE: 65 MMHG | WEIGHT: 293 LBS

## 2024-04-22 LAB
ANION GAP SERPL CALCULATED.3IONS-SCNC: 3 MMOL/L (ref 7–16)
BUN SERPL-MCNC: 13 MG/DL (ref 7–18)
BUN/CREAT SERPL: 13 (ref 6–20)
CALCIUM SERPL-MCNC: 9.2 MG/DL (ref 8.5–10.1)
CHLORIDE SERPL-SCNC: 106 MMOL/L (ref 98–107)
CO2 SERPL-SCNC: 36 MMOL/L (ref 21–32)
CREAT SERPL-MCNC: 1.01 MG/DL (ref 0.55–1.02)
EGFR (NO RACE VARIABLE) (RUSH/TITUS): 70 ML/MIN/1.73M2
GLUCOSE SERPL-MCNC: 124 MG/DL (ref 70–105)
GLUCOSE SERPL-MCNC: 154 MG/DL (ref 74–106)
POTASSIUM SERPL-SCNC: 3.8 MMOL/L (ref 3.5–5.1)
SODIUM SERPL-SCNC: 141 MMOL/L (ref 136–145)

## 2024-04-22 PROCEDURE — 94660 CPAP INITIATION&MGMT: CPT

## 2024-04-22 PROCEDURE — 25000242 PHARM REV CODE 250 ALT 637 W/ HCPCS: Performed by: HOSPITALIST

## 2024-04-22 PROCEDURE — 25000003 PHARM REV CODE 250: Performed by: INTERNAL MEDICINE

## 2024-04-22 PROCEDURE — 99900035 HC TECH TIME PER 15 MIN (STAT)

## 2024-04-22 PROCEDURE — 80048 BASIC METABOLIC PNL TOTAL CA: CPT | Performed by: INTERNAL MEDICINE

## 2024-04-22 PROCEDURE — 94761 N-INVAS EAR/PLS OXIMETRY MLT: CPT

## 2024-04-22 PROCEDURE — 63600175 PHARM REV CODE 636 W HCPCS: Performed by: HOSPITALIST

## 2024-04-22 PROCEDURE — 99239 HOSP IP/OBS DSCHRG MGMT >30: CPT | Mod: ,,, | Performed by: INTERNAL MEDICINE

## 2024-04-22 PROCEDURE — 82962 GLUCOSE BLOOD TEST: CPT

## 2024-04-22 PROCEDURE — 25000003 PHARM REV CODE 250: Performed by: HOSPITALIST

## 2024-04-22 PROCEDURE — 99233 SBSQ HOSP IP/OBS HIGH 50: CPT | Mod: ,,, | Performed by: NURSE PRACTITIONER

## 2024-04-22 PROCEDURE — 94640 AIRWAY INHALATION TREATMENT: CPT

## 2024-04-22 PROCEDURE — 27000221 HC OXYGEN, UP TO 24 HOURS

## 2024-04-22 RX ORDER — METOLAZONE 2.5 MG/1
2.5 TABLET ORAL DAILY PRN
Qty: 15 TABLET | Refills: 0 | Status: SHIPPED | OUTPATIENT
Start: 2024-04-22 | End: 2024-04-30

## 2024-04-22 RX ORDER — NAPROXEN SODIUM 220 MG/1
81 TABLET, FILM COATED ORAL DAILY
Qty: 30 TABLET | Refills: 0 | Status: SHIPPED | OUTPATIENT
Start: 2024-04-23 | End: 2024-04-30

## 2024-04-22 RX ORDER — DAPAGLIFLOZIN 10 MG/1
10 TABLET, FILM COATED ORAL DAILY
Qty: 30 TABLET | Refills: 0 | Status: SHIPPED | OUTPATIENT
Start: 2024-04-23 | End: 2024-05-23

## 2024-04-22 RX ORDER — ATORVASTATIN CALCIUM 40 MG/1
40 TABLET, FILM COATED ORAL NIGHTLY
Qty: 30 TABLET | Refills: 0 | Status: SHIPPED | OUTPATIENT
Start: 2024-04-22 | End: 2024-04-30

## 2024-04-22 RX ADMIN — ASPIRIN 81 MG CHEWABLE TABLET 81 MG: 81 TABLET CHEWABLE at 08:04

## 2024-04-22 RX ADMIN — BUDESONIDE INHALATION 0.5 MG: 0.5 SUSPENSION RESPIRATORY (INHALATION) at 07:04

## 2024-04-22 RX ADMIN — DAPAGLIFLOZIN 10 MG: 10 TABLET, FILM COATED ORAL at 08:04

## 2024-04-22 RX ADMIN — HYDROCHLOROTHIAZIDE 12.5 MG: 12.5 TABLET ORAL at 08:04

## 2024-04-22 RX ADMIN — LEVALBUTEROL HYDROCHLORIDE 1.25 MG: 1.25 SOLUTION RESPIRATORY (INHALATION) at 07:04

## 2024-04-22 RX ADMIN — APIXABAN 2.5 MG: 2.5 TABLET, FILM COATED ORAL at 08:04

## 2024-04-22 RX ADMIN — LOSARTAN POTASSIUM 50 MG: 50 TABLET, FILM COATED ORAL at 08:04

## 2024-04-22 RX ADMIN — CARVEDILOL 12.5 MG: 12.5 TABLET, FILM COATED ORAL at 08:04

## 2024-04-22 RX ADMIN — FUROSEMIDE 40 MG: 10 INJECTION, SOLUTION INTRAVENOUS at 08:04

## 2024-04-22 NOTE — NURSING
Discharge paperwork reviewed with patient and family, acknowledged understanding.Patient left floor @ this time,via wheelchair per Nurse.

## 2024-04-22 NOTE — PLAN OF CARE
Problem: Adult Inpatient Plan of Care  Goal: Plan of Care Review  Outcome: Adequate for Care Transition  Goal: Patient-Specific Goal (Individualized)  Outcome: Adequate for Care Transition  Goal: Absence of Hospital-Acquired Illness or Injury  Outcome: Adequate for Care Transition  Goal: Optimal Comfort and Wellbeing  Outcome: Adequate for Care Transition  Goal: Readiness for Transition of Care  Outcome: Adequate for Care Transition     Problem: Bariatric Environmental Safety  Goal: Safety Maintained with Care  Outcome: Adequate for Care Transition     Problem: Gas Exchange Impaired  Goal: Optimal Gas Exchange  Outcome: Adequate for Care Transition     Problem: Diabetes Comorbidity  Goal: Blood Glucose Level Within Targeted Range  Outcome: Adequate for Care Transition

## 2024-04-22 NOTE — PLAN OF CARE
Problem: Adult Inpatient Plan of Care  Goal: Plan of Care Review  Outcome: Ongoing, Progressing  Goal: Patient-Specific Goal (Individualized)  Outcome: Ongoing, Progressing  Goal: Absence of Hospital-Acquired Illness or Injury  Outcome: Ongoing, Progressing  Goal: Optimal Comfort and Wellbeing  Outcome: Ongoing, Progressing  Goal: Readiness for Transition of Care  Outcome: Ongoing, Progressing     Problem: Bariatric Environmental Safety  Goal: Safety Maintained with Care  Outcome: Ongoing, Progressing     Problem: Gas Exchange Impaired  Goal: Optimal Gas Exchange  Outcome: Ongoing, Progressing     Problem: Diabetes Comorbidity  Goal: Blood Glucose Level Within Targeted Range  Outcome: Ongoing, Progressing

## 2024-04-22 NOTE — CONSULTS
Consult received and appreciated. Diet education completed. Patient receptive and compliance encouraged.

## 2024-04-22 NOTE — NURSING
Patient expresses she feels much better today and would like to go home; she expresses an interest in portable oxygen due to 2 story apartment. Observed her walk from bathroom to bed and experience SOB relieved by 2L NC. She states she will discuss it with the Dr. CAZARES.

## 2024-04-22 NOTE — NURSING
Patient ordered 6 minute walk for home  O2. Beginning walk 96%, 3 min errol 94%, 6 min errol back to room 88% on RA. Pulse 115, RR 22.

## 2024-04-22 NOTE — NURSING
Patient aware of discharge @ this time, Lines removed and patient belongings returned.Patient  waits for supply from medical supply @ this time.

## 2024-04-22 NOTE — PROGRESS NOTES
[facility] - [facility] Tele[facility]  Cardiology  Progress Note    Patient Name: Padmini Gaspar  MRN: [id number]  Admission Date: 4/19/2024  Hospital Length of Stay: 3 days  Code Status: Full Code   Attending Physician: Rylan Antony MD   Primary Care Physician: Mildred Castillo NP  Expected Discharge Date: 4/22/2024  Principal Problem:Acute CHF    Subjective:     Hospital Course:   PT started on  IV diuresis with rapid improvement in symptoms of shortness of breath and lower extremity edema.      Interval History: Patient seen today, plan to place ZIO monitor prior to discharge today for reported palpitations and irregular heart beat. No arrhythmias noted on tele.     Review of Systems   Cardiovascular:  Positive for dyspnea on exertion, irregular heartbeat and palpitations. Negative for chest pain.   Respiratory:  Positive for shortness of breath.      Objective:     Vital Signs (Most Recent):  Temp: 97.8 °F (36.6 °C) (04/22/24 1045)  Pulse: 78 (04/22/24 1045)  Resp: 20 (04/22/24 1045)  BP: 100/65 (04/22/24 1045)  SpO2: 97 % (04/22/24 1045) Vital Signs (24h Range):  Temp:  [97.5 °F (36.4 °C)-98.3 °F (36.8 °C)] 97.8 °F (36.6 °C)  Pulse:  [] 78  Resp:  [16-22] 20  SpO2:  [96 %-100 %] 97 %  BP: (100-154)/(65-93) 100/65     Weight: (!) 183.7 kg (405 lb)  Body mass index is 56.49 kg/m².     SpO2: 97 %         Intake/Output Summary (Last 24 hours) at 4/22/2024 1213  Last data filed at 4/22/2024 0540  Gross per 24 hour   Intake 480 ml   Output --   Net 480 ml       Lines/Drains/Airways       None                      Physical Exam  Constitutional:       General: She is not in acute distress.     Appearance: She is obese.   Cardiovascular:      Rate and Rhythm: Normal rate and regular rhythm.      Heart sounds: Murmur heard.   Abdominal:      General: Bowel sounds are normal.      Palpations: Abdomen is soft.   Skin:     General: Skin is warm and dry.   Neurological:      Mental Status: She is alert and  "oriented to person, place, and time.            Significant Labs: ABG: No results for input(s): "PH", "PCO2", "HCO3", "POCSATURATED", "BE" in the last 48 hours., Blood Culture: No results for input(s): "LABBLOO" in the last 48 hours., BMP:   Recent Labs   Lab 04/21/24  0436 04/22/24  0827   * 154*    141   K 3.8 3.8   * 106   CO2 31 36*   BUN 19* 13   CREATININE 0.87 1.01   CALCIUM 8.5 9.2   , CMP   Recent Labs   Lab 04/21/24  0436 04/22/24  0827    141   K 3.8 3.8   * 106   CO2 31 36*   * 154*   BUN 19* 13   CREATININE 0.87 1.01   CALCIUM 8.5 9.2   ANIONGAP 7 3*   , CBC   Recent Labs   Lab 04/21/24  0436   WBC 6.05   HGB 9.5*   HCT 32.5*      , Lipid Panel No results for input(s): "CHOL", "HDL", "LDLCALC", "TRIG", "CHOLHDL" in the last 48 hours., and Troponin No results for input(s): "TROPONINI" in the last 48 hours.    Significant Imaging: Cardiac Cath: Results for orders placed during the hospital encounter of 07/11/21    Cardiac catheterization    Conclusion  · The estimated blood loss was none.  · The coronary arteries were normal..    The procedure log was documented by Documenter: RT Cody and verified by Isaías Darden MD.    Date: 7/11/2021  Time: 11:22 AM     , Echocardiogram: Transthoracic echo (TTE) complete (Cupid Only):   Results for orders placed or performed during the hospital encounter of 04/19/24   Echo   Result Value Ref Range    BSA 2.97 m2    LVOT stroke volume 110.31 cm3    LVIDd 4.17 3.5 - 6.0 cm    LV Systolic Volume 24.87 mL    LV Systolic Volume Index 8.9 mL/m2    LVIDs 2.61 2.1 - 4.0 cm    LV Diastolic Volume 77.31 mL    LV Diastolic Volume Index 27.61 mL/m2    IVS 1.81 (A) 0.6 - 1.1 cm    LVOT diameter 2.03 cm    LVOT area 3.2 cm2    FS 37 28 - 44 %    Left Ventricle Relative Wall Thickness 0.88 cm    Posterior Wall 1.83 (A) 0.6 - 1.1 cm    LV mass 336.62 g    LV Mass Index 120 g/m2    MV Peak E Fritz 0.92 m/s    TDI LATERAL 0.08 m/s "    TDI SEPTAL 0.06 m/s    E/E' ratio 13.14 m/s    MV Peak A Fritz 0.83 m/s    TR Max Fritz 1.20 m/s    E/A ratio 1.11     E wave deceleration time 370.84 msec    LV SEPTAL E/E' RATIO 15.33 m/s    LV LATERAL E/E' RATIO 11.50 m/s    LVOT peak fritz 1.44 m/s    Left Ventricular Outflow Tract Mean Velocity 0.98 cm/s    Left Ventricular Outflow Tract Mean Gradient 4.48 mmHg    Right ventricular length in diastole (apical 4-chamber view) 7.23 cm    RV mid diameter 2.33 cm    TAPSE 2.63 cm    LA volume (mod) 54.91 cm3    LA Volume Index (Mod) 19.6 mL/m2    RA area 14.6 cm2    Right Atrium Volume Systolic 33.27 mL    AV mean gradient 6 mmHg    AV peak gradient 11 mmHg    Ao peak fritz 1.65 m/s    Ao VTI 34.80 cm    LVOT peak VTI 34.10 cm    AV valve area 3.17 cm²    AV Velocity Ratio 0.87     AV index (prosthetic) 0.98     MEET by Velocity Ratio 2.82 cm²    Mr max fritz 2.18 m/s    MV stenosis pressure 1/2 time 107.54 ms    MV valve area p 1/2 method 2.05 cm2    Triscuspid Valve Regurgitation Peak Gradient 6 mmHg    PV PEAK VELOCITY 1.34 m/s    PV peak gradient 7 mmHg    Ao root annulus 2.98 cm    Mean e' 0.07 m/s    ZLVIDS -16.58     ZLVIDD -21.87     AORTIC VALVE CUSP SEPERATION 2.30 cm    Narrative      Left Ventricle: The left ventricle is normal in size. Mildly increased   wall thickness. Septal thickening. There is normal systolic function with   a visually estimated ejection fraction of 65 - 70%. There is normal   diastolic function.    Right Ventricle: Normal right ventricular cavity size. Systolic   function is normal.    Left Atrium: Left atrium is dilated.    Aortic Valve: The aortic valve is a trileaflet valve.    Mitral Valve: There is mild to moderate regurgitation.     , and X-Ray: CXR: X-Ray Chest 1 View (CXR): No results found for this visit on 04/19/24.  Assessment and Plan:     Brief HPI:   Cc:Shortness of breath, chest pain     PT complains of left sided chest pain, across underside of left breast, 8/10 at most  severe.  Chest pain comes and goes spontaneously, is associated with shortness of breath, lasts from seconds to fifteen minutes.  She notes shortness of breath with minimal activity, usually improves with use of MDI.  She also reports increasing lower extremity edema, bilat, worsening over last several weeks.  She was not using CPAP for the last several months, did start using consistently last two days before admission. She has also experienced palpitations, feels like her heart starts beating fast and irregular, comes and goes spontaneously, lasts from seconds up to five minutes.  Pt notes she underwent extensive cardiac eval , including [facility], 2021, which demonstrated normal coronary arteries, and normal LV systolic function.        * Acute CHF  Patient is identified as having Diastolic (HFpEF) heart failure that is Acute on chronic. CHF is currently controlled. Latest ECHO performed and demonstrates- Results for orders placed during the hospital encounter of 04/19/24    Echo    Interpretation Summary    Left Ventricle: The left ventricle is normal in size. Mildly increased wall thickness. Septal thickening. There is normal systolic function with a visually estimated ejection fraction of 65 - 70%. There is normal diastolic function.    Right Ventricle: Normal right ventricular cavity size. Systolic function is normal.    Left Atrium: Left atrium is dilated.    Aortic Valve: The aortic valve is a trileaflet valve.    Mitral Valve: There is mild to moderate regurgitation.  . Continue Beta Blocker, ACE/ARB, and Nitrate/Vasodilator and monitor clinical status closely. Monitor on telemetry. Patient is off CHF pathway.  Monitor strict Is&Os and daily weights.  Place on fluid restriction of 2 L. Cardiology has been consulted. Continue to stress to patient importance of self efficacy and  on diet for CHF. Last BNP reviewed- and noted below     Palpitations  4/22/2024:  - Tele reviewed, SR and ST. No arrhythmias  noted  - Place ZIO patch prior to discharge for further monitoring  - Follow up with cardiology in 4 weeks for results    Moderate mitral regurgitation by prior echocardiogram  4/22/2024:  - Continue following in outpatient cardiology clinic    Sleep apnea  Was non-compliant with CPAP for several months, suspect chronic hypoxia conttributing to acute on chronic diastolic heart failure.     Morbid obesity with BMI of 50.0-59.9, adult  Body mass index is 54.25 kg/m². Morbid obesity complicates all aspects of disease management from diagnostic modalities to treatment. Weight loss encouraged and health benefits explained to patient.  Weight increase over forty pounds over last six months due to limitation of activity due to low back pain         Asthma  Controlled on MDIs.           VTE Risk Mitigation (From admission, onward)           Ordered     apixaban tablet 2.5 mg  2 times daily         04/20/24 5001                    ZAHEER Alejo  Cardiology  [facility] - [address] [place] [address]

## 2024-04-22 NOTE — PLAN OF CARE
[facility] - [facility] Telemetry  Discharge Final Note    Primary Care Provider: Mildred Castillo NP    Expected Discharge Date: 4/22/2024    Final Discharge Note (most recent)       Final Note - 04/22/24 1407          Final Note    Assessment Type Final Discharge Note     Anticipated Discharge Disposition Home or Self Care        Post-Acute Status    Discharge Delays None known at this time                     Important Message from Medicare              Follow-up providers       Deyanira Friedman FNP   Specialty: Cardiology    [address] [place] [address] [facility] [place] [address]   Phone: [phone]       Next Steps: Follow up in 4 week(s)    Instructions: Please schedule follow up with NAOMI brown, in 4 weeks; Hospital discharge, DORONO Shanna Trevino DO   Specialty: Family Medicine, Hospitalist    [address] [place] [id number]   Phone: [phone]       Next Steps: Follow up on 4/30/2024    Instructions: establish care for PCP.  Asha april 30, 2024 at 2:00    Rebel Milian MD   Specialty: Neurology    [address] [place], [place] [address]   Phone: [phone]       Next Steps: Follow up in 1 week(s)    Instructions: establish care for migraines.  Must call and schedule appiontment              After-discharge care                Durable Medical Equipment       ideeli   Service: Durable Medical Equipment    [address] [place] [address]   Phone: [phone]                             Pt rcd portable tank to room for dc. 0 further dc needs.

## 2024-04-22 NOTE — HOSPITAL COURSE
For a more detailed hospital course see IP notes briefly, pt was a/w SOB 2/2 chf exacerbation (likely hfpef), pt was diuresed w/ iv lasix as per cardiology recs. She did well and now feeling much better and wanting to go home. Pt would follow up with PCP and cardiology in clinic (cardiology service made outpaitent apt). No new concerns voiced by pt. We spoke in detail about the hazards of obesity , and ways to tackle it. Dietician saw the pt as well today for more information.

## 2024-04-22 NOTE — DISCHARGE SUMMARY
[facility] - [address] [place] [address] Medicine  Discharge Summary      Patient Name: Padmini Gaspar  MRN: [id number]  KRYSTYNA: [id number]  Patient Class: IP- Inpatient  Admission Date: 4/19/2024  Hospital Length of Stay: 3 days  Discharge Date and Time:  04/22/2024 11:59 AM  Attending Physician: Rylan Antony MD   Discharging Provider: Rylan Antony MD  Primary Care Provider: Mildred Castillo NP    Primary Care Team: Networked reference to record PCT     HPI:   42 yo F presents to [facility] ED with worsening CP and dyspnea.  She has BLE and obvious orthopnea.  She says the CP hurts when she takes deep breath and she does have asthma and has been using her prn inhaler.  She says what began as DE SOUZA is now orthopnea where she has to sit up to breath.  She does use a CPAP at night on setting 10 and this was diagnosed in 2021 when Dr. Darden performed [facility] and echo showing no diastolic or systolic function but her RVSP was 30 mmHg at that time showing mild PHTN and norvasc started.      Patient is compliant with meds and has been taking lasix daily.  She says that last week she got a throbbing HA and thought she may be having a migraine but has no h/o migraines.  Has improved with lowering of her BP and she feels better at this time.  She was giving IV lasix in the ED and has had significant UOP.   Not sure if improved BP really has helped with the HA but should be followed.  May be tension from poor sleep but idiopathic intracranial hypertension is always in the differential.  Hopefull will improve with diuresis.      Please see assessment and plan below for detailed problem bases evaluation.      * No surgery found *      Hospital Course:   For a more detailed hospital course see IP notes briefly, pt was a/w SOB 2/2 chf exacerbation (likely hfpef), pt was diuresed w/ iv lasix as per cardiology recs. She did well and now feeling much better and wanting to go home. Pt would follow up with PCP and cardiology in  clinic (cardiology service made outpaitent apt). No new concerns voiced by pt. We spoke in detail about the hazards of obesity , and ways to tackle it. Dietician saw the pt as well today for more information.      Goals of Care Treatment Preferences:  Code Status: Full Code      Consults:   Consults (From admission, onward)          Status Ordering Provider     Inpatient consult to Registered Dietitian/Nutritionist  Once        Provider:  (Not yet assigned)    Completed KANA, REHMAT U     Inpatient consult to Social Work  Once        Provider:  (Not yet assigned)    Completed KANA, REHMAT U     Inpatient consult to Cardiology  Once        Provider:  (Not yet assigned)    Completed PRESTON COREA            No new Assessment & Plan notes have been filed under this hospital service since the last note was generated.  Service: Hospital Medicine    Final Active Diagnoses:    Diagnosis Date Noted POA    PRINCIPAL PROBLEM:  Acute CHF [I50.9] 04/19/2024 Yes    Chest pain [R07.9] 04/21/2024 Unknown    Palpitations [R00.2] 04/21/2024 Yes    Acute on chronic diastolic (congestive) heart failure [I50.33] 04/21/2024 Yes    Diabetes mellitus without complication [E11.9] 04/21/2024 Yes    Acute intractable headache [R51.9] 04/20/2024 Yes    Moderate mitral regurgitation by prior echocardiogram [I34.0]  No    Morbid obesity with BMI of 50.0-59.9, adult [E66.01, Z68.43] 04/19/2024 Not Applicable    Demand ischemia of myocardium [I24.89] 04/19/2024 Yes    Sleep apnea [G47.30]  Yes    Essential (primary) hypertension [I10]  Yes    Asthma [J45.909] 07/11/2021 Yes      Problems Resolved During this Admission:       Discharged Condition: stable    Disposition:     Follow Up:   Follow-up Information       Deyanira Friedman, ZAHEER Follow up in 4 week(s).    Specialty: Cardiology  Why: Please schedule follow up with cardiologyNAOMI, in 4 weeks; Hospital discharge, JOSE results  Contact information:  [address] [facility]  [facility]  [place] [address]  [phone]               Shanna Alejandro DO Follow up in 1 week(s).    Specialties: Family Medicine, Hospitalist  Why: establish care for PCP.  Contact information:  [address]  [place] [address]  [phone]               Rebel Milian MD Follow up in 1 week(s).    Specialty: Neurology  Why: establish care for migraines.  Contact information:  [address] [place]  [place] [address]  [phone]                           Patient Instructions:      Diet Cardiac     Notify your health care provider if you experience any of the following:  temperature >100.4     Notify your health care provider if you experience any of the following:  persistent nausea and vomiting or diarrhea     Notify your health care provider if you experience any of the following:  severe uncontrolled pain     Notify your health care provider if you experience any of the following:  difficulty breathing or increased cough     Notify your health care provider if you experience any of the following:  severe persistent headache     Notify your health care provider if you experience any of the following:  worsening rash     Notify your health care provider if you experience any of the following:  persistent dizziness, light-headedness, or visual disturbances     Notify your health care provider if you experience any of the following:  increased confusion or weakness     Activity as tolerated       Significant Diagnostic Studies: N/A    Pending Diagnostic Studies:       Procedure Component Value Units Date/Time    EXTRA TUBES [[id number]] Collected: 04/22/24 0827    Order Status: Sent Lab Status: In process Updated: 04/22/24 0912    Specimen: Blood, Venous     Narrative:      The following orders were created for panel order EXTRA TUBES.  Procedure                               Abnormality         Status                     ---------                               -----------          ------                     Lavender Top Hold[[id number]]                               In process                   Please view results for these tests on the individual orders.           Medications:  Reconciled Home Medications:      Medication List        START taking these medications      aspirin 81 MG Chew  Take 1 tablet (81 mg total) by mouth once daily.  Start taking on: April 23, 2024     atorvastatin 40 MG tablet  Commonly known as: LIPITOR  Take 1 tablet (40 mg total) by mouth every evening.     dapagliflozin propanediol 10 mg tablet  Commonly known as: Farxiga  Take 1 tablet (10 mg total) by mouth once daily.  Start taking on: April 23, 2024     metOLazone 2.5 MG tablet  Commonly known as: ZAROXOLYN  Take 1 tablet (2.5 mg total) by mouth daily as needed (for swelling of legs and shortness of breath.).            CONTINUE taking these medications      * albuterol 2.5 mg /3 mL (0.083 %) nebulizer solution  Commonly known as: PROVENTIL  Take 2.5 mg by nebulization every 4 (four) hours as needed.     * albuterol 90 mcg/actuation inhaler  Commonly known as: PROVENTIL/VENTOLIN HFA  Inhale 2 puffs into the lungs every 4 (four) hours as needed.     amLODIPine 10 MG tablet  Commonly known as: NORVASC  Take 10 mg by mouth once daily.     carvediloL 25 MG tablet  Commonly known as: COREG  Take 25 mg by mouth 2 (two) times daily.     fluticasone furoate-vilanteroL 100-25 mcg/dose diskus inhaler  Commonly known as: BREO  Inhale 1 puff into the lungs once daily.     furosemide 40 MG tablet  Commonly known as: LASIX  Take 40 mg by mouth every morning.     metFORMIN 500 MG tablet  Commonly known as: GLUCOPHAGE  Take 500 mg by mouth 2 (two) times daily with meals.     olmesartan-hydrochlorothiazide 40-25 mg per tablet  Commonly known as: BENICAR HCT  Take 1 tablet by mouth once daily.           * This list has 2 medication(s) that are the same as other medications prescribed for you. Read the directions carefully, and  ask your doctor or other care provider to review them with you.                STOP taking these medications      minoxidiL 10 MG Tab  Commonly known as: LONITEN              Indwelling Lines/Drains at time of discharge:   Lines/Drains/Airways       None                   Time spent on the discharge of patient: >30 minutes         Rehmat BRISSA Antony MD  Department of Hospital Medicine  [facility] - [address] [place] [address]

## 2024-04-22 NOTE — SUBJECTIVE & OBJECTIVE
"Interval History: Patient seen today, plan to place ZIO monitor prior to discharge today for reported palpitations and irregular heart beat. No arrhythmias noted on tele.     Review of Systems   Cardiovascular:  Positive for dyspnea on exertion, irregular heartbeat and palpitations. Negative for chest pain.   Respiratory:  Positive for shortness of breath.      Objective:     Vital Signs (Most Recent):  Temp: 97.8 °F (36.6 °C) (04/22/24 1045)  Pulse: 78 (04/22/24 1045)  Resp: 20 (04/22/24 1045)  BP: 100/65 (04/22/24 1045)  SpO2: 97 % (04/22/24 1045) Vital Signs (24h Range):  Temp:  [97.5 °F (36.4 °C)-98.3 °F (36.8 °C)] 97.8 °F (36.6 °C)  Pulse:  [] 78  Resp:  [16-22] 20  SpO2:  [96 %-100 %] 97 %  BP: (100-154)/(65-93) 100/65     Weight: (!) 183.7 kg (405 lb)  Body mass index is 56.49 kg/m².     SpO2: 97 %         Intake/Output Summary (Last 24 hours) at 4/22/2024 1213  Last data filed at 4/22/2024 0540  Gross per 24 hour   Intake 480 ml   Output --   Net 480 ml       Lines/Drains/Airways       None                      Physical Exam  Constitutional:       General: She is not in acute distress.     Appearance: She is obese.   Cardiovascular:      Rate and Rhythm: Normal rate and regular rhythm.      Heart sounds: Murmur heard.   Abdominal:      General: Bowel sounds are normal.      Palpations: Abdomen is soft.   Skin:     General: Skin is warm and dry.   Neurological:      Mental Status: She is alert and oriented to person, place, and time.            Significant Labs: ABG: No results for input(s): "PH", "PCO2", "HCO3", "POCSATURATED", "BE" in the last 48 hours., Blood Culture: No results for input(s): "LABBLOO" in the last 48 hours., BMP:   Recent Labs   Lab 04/21/24  0436 04/22/24  0827   * 154*    141   K 3.8 3.8   * 106   CO2 31 36*   BUN 19* 13   CREATININE 0.87 1.01   CALCIUM 8.5 9.2   , CMP   Recent Labs   Lab 04/21/24  0436 04/22/24  0827    141   K 3.8 3.8   * 106   CO2 " "31 36*   * 154*   BUN 19* 13   CREATININE 0.87 1.01   CALCIUM 8.5 9.2   ANIONGAP 7 3*   , CBC   Recent Labs   Lab 04/21/24  0436   WBC 6.05   HGB 9.5*   HCT 32.5*      , Lipid Panel No results for input(s): "CHOL", "HDL", "LDLCALC", "TRIG", "CHOLHDL" in the last 48 hours., and Troponin No results for input(s): "TROPONINI" in the last 48 hours.    Significant Imaging: Cardiac Cath: Results for orders placed during the hospital encounter of 07/11/21    Cardiac catheterization    Conclusion  · The estimated blood loss was none.  · The coronary arteries were normal..    The procedure log was documented by Documenter: RT Cody and verified by Isaías Darden MD.    Date: 7/11/2021  Time: 11:22 AM     , Echocardiogram: Transthoracic echo (TTE) complete (Cupid Only):   Results for orders placed or performed during the hospital encounter of 04/19/24   Echo   Result Value Ref Range    BSA 2.97 m2    LVOT stroke volume 110.31 cm3    LVIDd 4.17 3.5 - 6.0 cm    LV Systolic Volume 24.87 mL    LV Systolic Volume Index 8.9 mL/m2    LVIDs 2.61 2.1 - 4.0 cm    LV Diastolic Volume 77.31 mL    LV Diastolic Volume Index 27.61 mL/m2    IVS 1.81 (A) 0.6 - 1.1 cm    LVOT diameter 2.03 cm    LVOT area 3.2 cm2    FS 37 28 - 44 %    Left Ventricle Relative Wall Thickness 0.88 cm    Posterior Wall 1.83 (A) 0.6 - 1.1 cm    LV mass 336.62 g    LV Mass Index 120 g/m2    MV Peak E Stacia 0.92 m/s    TDI LATERAL 0.08 m/s    TDI SEPTAL 0.06 m/s    E/E' ratio 13.14 m/s    MV Peak A Stacia 0.83 m/s    TR Max Stacia 1.20 m/s    E/A ratio 1.11     E wave deceleration time 370.84 msec    LV SEPTAL E/E' RATIO 15.33 m/s    LV LATERAL E/E' RATIO 11.50 m/s    LVOT peak stacia 1.44 m/s    Left Ventricular Outflow Tract Mean Velocity 0.98 cm/s    Left Ventricular Outflow Tract Mean Gradient 4.48 mmHg    Right ventricular length in diastole (apical 4-chamber view) 7.23 cm    RV mid diameter 2.33 cm    TAPSE 2.63 cm    LA volume (mod) 54.91 cm3    " LA Volume Index (Mod) 19.6 mL/m2    RA area 14.6 cm2    Right Atrium Volume Systolic 33.27 mL    AV mean gradient 6 mmHg    AV peak gradient 11 mmHg    Ao peak stacia 1.65 m/s    Ao VTI 34.80 cm    LVOT peak VTI 34.10 cm    AV valve area 3.17 cm²    AV Velocity Ratio 0.87     AV index (prosthetic) 0.98     MEET by Velocity Ratio 2.82 cm²    Mr max stacia 2.18 m/s    MV stenosis pressure 1/2 time 107.54 ms    MV valve area p 1/2 method 2.05 cm2    Triscuspid Valve Regurgitation Peak Gradient 6 mmHg    PV PEAK VELOCITY 1.34 m/s    PV peak gradient 7 mmHg    Ao root annulus 2.98 cm    Mean e' 0.07 m/s    ZLVIDS -16.58     ZLVIDD -21.87     AORTIC VALVE CUSP SEPERATION 2.30 cm    Narrative      Left Ventricle: The left ventricle is normal in size. Mildly increased   wall thickness. Septal thickening. There is normal systolic function with   a visually estimated ejection fraction of 65 - 70%. There is normal   diastolic function.    Right Ventricle: Normal right ventricular cavity size. Systolic   function is normal.    Left Atrium: Left atrium is dilated.    Aortic Valve: The aortic valve is a trileaflet valve.    Mitral Valve: There is mild to moderate regurgitation.     , and X-Ray: CXR: X-Ray Chest 1 View (CXR): No results found for this visit on 04/19/24.

## 2024-04-22 NOTE — ASSESSMENT & PLAN NOTE
4/22/2024:  - Tele reviewed, SR and ST. No arrhythmias noted  - Place ZIO patch prior to discharge for further monitoring  - Follow up with cardiology in 4 weeks for results

## 2024-04-22 NOTE — PLAN OF CARE
[facility] - [facility] Telemetry  Initial Discharge Assessment       Primary Care Provider: Mildred Castillo NP    Admission Diagnosis: Demand ischemia of myocardium [I24.89]  Chest pain [R07.9]  Acute on chronic diastolic (congestive) heart failure [I50.33]    Admission Date: 4/19/2024  Expected Discharge Date: 4/22/2024    Transition of Care Barriers: None    Payor: MEDICAID [place] / Plan: [place]ICA[place] [facility] [place] / Product Type: Managed Medicaid /     No emergency contact information on file.    Discharge Plan A: Home  Discharge Plan B: Home      [facility] [place] [address]  [address]  [place] [address]  Phone: [phone] Fax: [phone]    [id number] - [place] - [address] AT [facility] [address]  [address]  [place] [address]  Phone: [phone] Fax: [phone]    [facility] [place] - [address]  [address]  [place] [address]  Phone: [phone] Fax: [phone]      Initial Assessment (most recent)       Adult Discharge Assessment - 04/22/24 1035          Discharge Assessment    Assessment Type Discharge Planning Assessment     Confirmed/corrected address, phone number and insurance Yes     Confirmed Demographics Correct on Facesheet     Source of Information patient     Communicated SHERRY with patient/caregiver Date not available/Unable to determine     People in Home child(jaclyn), dependent     Do you expect to return to your current living situation? Yes     Do you have help at home or someone to help you manage your care at home? No     Prior to hospitilization cognitive status: Unable to Assess     Current cognitive status: Alert/Oriented     Walking or Climbing Stairs Difficulty no     Dressing/Bathing Difficulty no     Home Layout Able to live on 1st floor     Equipment Currently Used at Home nebulizer;CPAP     Readmission within 30 days? No     Patient  currently being followed by outpatient case management? No     Do you currently have service(s) that help you manage your care at home? No     Do you take prescription medications? Yes     Do you have prescription coverage? Yes     Do you have any problems affording any of your prescribed medications? No     Is the patient taking medications as prescribed? yes     Who is going to help you get home at discharge? family     How do you get to doctors appointments? family or friend will provide;car, drives self     Are you on dialysis? No     Do you take coumadin? No     Discharge Plan A Home     Discharge Plan B Home     DME Needed Upon Discharge  oxygen     Discharge Plan discussed with: Patient     Transition of Care Barriers None        Physical Activity    On average, how many days per week do you engage in moderate to strenuous exercise (like a brisk walk)? 0 days     On average, how many minutes do you engage in exercise at this level? 0 min        Financial Resource Strain    How hard is it for you to pay for the very basics like food, housing, medical care, and heating? Not hard at all        Housing Stability    In the last 12 months, was there a time when you were not able to pay the mortgage or rent on time? No     In the past 12 months, how many times have you moved where you were living? 1     At any time in the past 12 months, were you homeless or living in a shelter (including now)? No        Transportation Needs    In the past 12 months, has lack of transportation kept you from medical appointments or from getting medications? No     In the past 12 months, has lack of transportation kept you from meetings, work, or from getting things needed for daily living? No        Food Insecurity    Within the past 12 months, you worried that your food would run out before you got the money to buy more. Never true     Within the past 12 months, the food you bought just didn't last and you didn't have money to get  "more. Never true        Stress    Do you feel stress - tense, restless, nervous, or anxious, or unable to sleep at night because your mind is troubled all the time - these days? Not at all        Social Connections    In a typical week, how many times do you talk on the phone with family, friends, or neighbors? More than three times a week     How often do you get together with friends or relatives? More than three times a week     How often do you attend Jewish or Taoist services? More than 4 times per year     Do you belong to any clubs or organizations such as Jewish groups, unions, fraternal or athletic groups, or school groups? Yes     How often do you attend meetings of the clubs or organizations you belong to? More than 4 times per year     Are you , , , , never , or living with a partner? Never         Alcohol Use    Q1: How often do you have a drink containing alcohol? Never     Q2: How many drinks containing alcohol do you have on a typical day when you are drinking? Patient does not drink     Q3: How often do you have six or more drinks on one occasion? Never                     Rcd consult for home oxygen, walk test was complete. [facility] Will follow.   1040  Spoke with pt now. She lives home with family, plans to return at [place]. [place]. Neb machine through the medical store. Pt failed Walk Test and sent to [facility] now for oxygen. Following dc needs as arise.   "

## 2024-04-23 LAB — GLUCOSE SERPL-MCNC: 91 MG/DL (ref 70–105)

## 2024-04-30 ENCOUNTER — OFFICE VISIT (OUTPATIENT)
Dept: FAMILY MEDICINE | Facility: CLINIC | Age: 44
End: 2024-04-30
Payer: MEDICAID

## 2024-04-30 VITALS
OXYGEN SATURATION: 96 % | RESPIRATION RATE: 18 BRPM | DIASTOLIC BLOOD PRESSURE: 87 MMHG | TEMPERATURE: 98 F | SYSTOLIC BLOOD PRESSURE: 133 MMHG | HEIGHT: 71 IN | HEART RATE: 74 BPM | WEIGHT: 293 LBS | BODY MASS INDEX: 41.02 KG/M2

## 2024-04-30 DIAGNOSIS — I50.33 ACUTE ON CHRONIC DIASTOLIC (CONGESTIVE) HEART FAILURE: ICD-10-CM

## 2024-04-30 DIAGNOSIS — E66.01 MORBID OBESITY WITH BMI OF 50.0-59.9, ADULT: ICD-10-CM

## 2024-04-30 DIAGNOSIS — I10 ESSENTIAL (PRIMARY) HYPERTENSION: ICD-10-CM

## 2024-04-30 DIAGNOSIS — J45.909 ASTHMA, UNSPECIFIED ASTHMA SEVERITY, UNSPECIFIED WHETHER COMPLICATED, UNSPECIFIED WHETHER PERSISTENT: ICD-10-CM

## 2024-04-30 DIAGNOSIS — J06.9 URTI (ACUTE UPPER RESPIRATORY INFECTION): ICD-10-CM

## 2024-04-30 DIAGNOSIS — E11.9 DIABETES MELLITUS WITHOUT COMPLICATION: ICD-10-CM

## 2024-04-30 DIAGNOSIS — Z09 HOSPITAL DISCHARGE FOLLOW-UP: Primary | ICD-10-CM

## 2024-04-30 DIAGNOSIS — G47.30 SLEEP APNEA, UNSPECIFIED TYPE: ICD-10-CM

## 2024-04-30 DIAGNOSIS — E78.5 HYPERLIPIDEMIA, UNSPECIFIED HYPERLIPIDEMIA TYPE: ICD-10-CM

## 2024-04-30 PROBLEM — R07.9 CHEST PAIN: Status: RESOLVED | Noted: 2024-04-21 | Resolved: 2024-04-30

## 2024-04-30 PROBLEM — R00.2 PALPITATIONS: Status: RESOLVED | Noted: 2024-04-21 | Resolved: 2024-04-30

## 2024-04-30 PROCEDURE — 99214 OFFICE O/P EST MOD 30 MIN: CPT | Mod: ,,, | Performed by: FAMILY MEDICINE

## 2024-04-30 PROCEDURE — 3079F DIAST BP 80-89 MM HG: CPT | Mod: CPTII,,, | Performed by: FAMILY MEDICINE

## 2024-04-30 PROCEDURE — 3008F BODY MASS INDEX DOCD: CPT | Mod: CPTII,,, | Performed by: FAMILY MEDICINE

## 2024-04-30 PROCEDURE — 1159F MED LIST DOCD IN RCRD: CPT | Mod: CPTII,,, | Performed by: FAMILY MEDICINE

## 2024-04-30 PROCEDURE — 1111F DSCHRG MED/CURRENT MED MERGE: CPT | Mod: CPTII,,, | Performed by: FAMILY MEDICINE

## 2024-04-30 PROCEDURE — 3044F HG A1C LEVEL LT 7.0%: CPT | Mod: CPTII,,, | Performed by: FAMILY MEDICINE

## 2024-04-30 PROCEDURE — 3075F SYST BP GE 130 - 139MM HG: CPT | Mod: CPTII,,, | Performed by: FAMILY MEDICINE

## 2024-04-30 RX ORDER — ATORVASTATIN CALCIUM 40 MG/1
40 TABLET, FILM COATED ORAL DAILY
Qty: 90 TABLET | Refills: 1 | Status: SHIPPED | OUTPATIENT
Start: 2024-04-30

## 2024-04-30 RX ORDER — METOLAZONE 2.5 MG/1
2.5 TABLET ORAL DAILY PRN
Qty: 30 TABLET | Refills: 1 | Status: SHIPPED | OUTPATIENT
Start: 2024-04-30

## 2024-04-30 RX ORDER — HYDRALAZINE HYDROCHLORIDE 25 MG/1
25 TABLET, FILM COATED ORAL 3 TIMES DAILY
COMMUNITY

## 2024-04-30 RX ORDER — IPRATROPIUM BROMIDE 21 UG/1
2 SPRAY, METERED NASAL 2 TIMES DAILY PRN
Qty: 30 ML | Refills: 0 | Status: SHIPPED | OUTPATIENT
Start: 2024-04-30

## 2024-04-30 RX ORDER — LANCETS 28 GAUGE
EACH MISCELLANEOUS
COMMUNITY

## 2024-04-30 RX ORDER — ASPIRIN 81 MG/1
81 TABLET ORAL DAILY
Qty: 90 TABLET | Refills: 1 | Status: SHIPPED | OUTPATIENT
Start: 2024-04-30

## 2024-04-30 RX ORDER — DEXTROMETHORPHAN POLISTIREX 30 MG/5ML
60 SUSPENSION ORAL 2 TIMES DAILY
Qty: 148 ML | Refills: 0 | Status: SHIPPED | OUTPATIENT
Start: 2024-04-30 | End: 2024-05-10

## 2024-04-30 NOTE — ASSESSMENT & PLAN NOTE
Body mass index is 55.54 kg/m². Morbid obesity complicates all aspects of disease management from diagnostic modalities to treatment. Weight loss encouraged and health benefits explained to patient.       A Simple Weight Loss Program  Step 1  1. Eat only at mealtime.  2. Eat what you always eat, but one-fourth less.  3. Avoid snacks. Don't buy them. If you don't buy them, you can't eat them.  4. Avoid foods with high-caloric content like desserts (pie, cake, ice cream, cookies).  5. If you drink soft drinks, drink only the ones without sugar. If you don't like them, most people get used to them. Then they don't like the sugary ones (taste too sweet).  6. If you do well and lose 1-2 pounds per week, every two weeks reward yourself with your favorite dessert.  7. By doing the above you will develop good dietary habits that will stay with you for a lifetime.  Step 2  Once you have been successful with step 1 (losing 1-2 pounds per week for a month), start a physical fitness program, by walking for 30 minutes five time per week.  Step 3  Once you are successful with steps 2 and step 2, start working on eating a healthy diet.

## 2024-04-30 NOTE — ASSESSMENT & PLAN NOTE
As per patient, she was using CPAP earlier, then later started using BIPAP.  Continue with BIPAP at home.

## 2024-04-30 NOTE — ASSESSMENT & PLAN NOTE
The patient seen for hospital discharge follow up.  - Patient was admitted for 3 days from 4/19/24 to 4/22/24 for management of acute exacerbation of CHF. -As per documentation, patient was managed with IV Lasix, was seen by Cardiology was discharged after improvement on symptoms.   -Home and discharge medications were reviewed.   -Patient was discharged and started on meds- Aspirin 81mg PO daily, Atorvastatin 40mg PO daily, Metolazone 2.5mg PO daily as needed and Farxiga 10mg PO daily.  - Patient has not taking Aspirin, Atorvastatin and Metolazone and states she have never received the prescription from pharmacy. We will send Aspirin, Atorvastatin and Metolazone to the pharmacy today. -The patient has Mildred Castillo NP as her PCP and states that she will follow up with PCP soon.   -Patient has scheduled Cardiology follow up on 05/2024 and is waiting appointment with Neurology ( Dr. Milian) for evaluation on Migraine.

## 2024-04-30 NOTE — PROGRESS NOTES
Nette Linda MD    [address], [place] [address]  Phone: [phone]     Subjective     Name: Padmini Gaspar   YOB: 1980 (43 y.o.)  MRN: [id number]  Visit Date: 4/30/2024   Chief Complaint: Congestive Heart Failure, Hypertension, Obesity, and Asthma (Patient came in today for a hfu for chf, htn, obesity and asthma. Patient stated she thing she has caught a bug coughing and runny nose that been going on for about week now.)        HISTORY OF PRESENT ILLNESS:  The patient is 43 year old female with past medical history of HFpEF, Sleep apnea (uses CPAP), HTN, DM2, HLD, COPD presented to [facility] clinic for hospital discharge follow up. Patient was admitted for 3 days from 4/19/24 to 4/22/24 for management of acute exacerbation of CHF. As per documentation, patient was managed with IV Lasix, was seen by Cardiology was discharged after improvement on symptoms. Has history of LHC on 2021 with normal LV systolic function. Last echo documented of 4/20/24- EF 65-70%   On encounter today, patient states she is doing good. SOB has improved . But she have nasal congestion and cough. Denies any acute chest pain, Fever, chills, nausea or vomiting. Appetite is normal. Normal bowel and bladder habit.   Home and discharge medications were reviewed. Patient was discharged and started on meds- Aspirin 81mg PO daily, Atorvastatin 40mg PO daily, Metolazone 2.5mg PO daily as needed and Farxiga 10mg PO daily. Patient has not taking Aspirin, Atorvastatin and Metolazone and states she have never received the prescription from pharmacy. We will send Aspirin, Atorvastatin and Metolazone to the pharmacy today. The patient has Mildred Catsillo NP as her PCP and states that she will follow up with PCP soon.   Patient has scheduled Cardiology follow up on 05/2024 and is waiting appointment with Neurology ( Dr. Milian) for evaluation on Migraine.        PAST MEDICAL HISTORY:  Significant Diagnoses - Patient   has a past medical history of Asthma, CHF (congestive heart failure), Diabetes mellitus type I, Essential (primary) hypertension, Moderate mitral regurgitation by prior echocardiogram, Obesity, unspecified, and Sleep apnea.  Medications - Patient has a current medication list which includes the following long-term medication(s): amlodipine, fluticasone furoate-vilanterol, furosemide, hydralazine, metformin, olmesartan-hydrochlorothiazide, aspirin, atorvastatin, and metolazone.   Allergies - Patient has No Known Allergies.  Surgeries - Patient  has a past surgical history that includes  section and Left heart catheterization (Right, 2021).  Family History - Patient family history includes Diabetes in her father and mother; Heart disease in her mother.      SOCIAL HISTORY:  Tobacco - Patient  reports that she has been smoking. She does not have any smokeless tobacco history on file.   Alcohol - Patient  has no history on file for alcohol use.   Recreational Drugs - Patient  reports that she does not currently use drugs.       Review of Systems   Constitutional:  Negative for activity change, appetite change, chills, fatigue and fever.   HENT:  Positive for nasal congestion, rhinorrhea and sore throat. Negative for ear pain.    Eyes:  Negative for pain.   Respiratory:  Positive for cough. Negative for shortness of breath.    Cardiovascular:  Negative for chest pain, palpitations and leg swelling.   Gastrointestinal:  Negative for abdominal pain.   Genitourinary:  Negative for dysuria.   Neurological:  Negative for dizziness and headaches.   Psychiatric/Behavioral:  Negative for agitation.           Past Medical History:   Diagnosis Date    Asthma     CHF (congestive heart failure)     Diabetes mellitus type I     Essential (primary) hypertension     Moderate mitral regurgitation by prior echocardiogram     Obesity, unspecified     Sleep apnea         Review of patient's allergies indicates:  No  Known Allergies     Past Surgical History:   Procedure Laterality Date     SECTION      LEFT HEART CATHETERIZATION Right 2021    Procedure: Left heart cath;  Surgeon: Isaías Darden MD;  Location[facility] CATH LAB;  Service: Cardiology;  Laterality: Right;        Family History   Problem Relation Name Age of Onset    Diabetes Mother      Heart disease Mother      Diabetes Father         Current Outpatient Medications   Medication Instructions    albuterol (PROVENTIL) 2.5 mg, Nebulization, Every 4 hours PRN    albuterol (PROVENTIL/VENTOLIN HFA) 90 mcg/actuation inhaler 2 puffs, Inhalation, Every 4 hours PRN    amLODIPine (NORVASC) 10 mg, Oral, Daily    aspirin (ECOTRIN) 81 mg, Oral, Daily    atorvastatin (LIPITOR) 40 mg, Oral, Daily    blood sugar diagnostic (TRUE METRIX GLUCOSE TEST STRIP MISC) USE TO CHECK GLUCOSE DAILY    blood-glucose meter (TRUE METRIX AIR GLUCOSE METER MISC) USE TO CHECK GLUCOSE DAILY    carvediloL (COREG) 25 mg, Oral, 2 times daily    dapagliflozin propanediol (FARXIGA) 10 mg, Oral, Daily    dextromethorphan (DELSYM 12 HOUR) 60 mg, Oral, 2 times daily    ferrous fumarate-folic acid 324 mg, 106mg iron,-1mg (HEMOCYTE-F) Tab 1 tablet, Oral, 2 times daily with meals    fluticasone furoate-vilanteroL (BREO) 100-25 mcg/dose diskus inhaler 1 puff, Inhalation, Daily    furosemide (LASIX) 40 mg, Oral, Every morning    hydrALAZINE (APRESOLINE) 25 mg, Oral, 3 times daily    ipratropium (ATROVENT) 21 mcg (0.03 %) nasal spray 2 sprays, Each Nostril, 2 times daily PRN    lancets (TRUEPLUS LANCETS) 28 gauge Misc USE TO CHECK BLOOD SUGAR DAILY    metFORMIN (GLUCOPHAGE) 500 mg, Oral, 2 times daily with meals    metOLazone (ZAROXOLYN) 2.5 mg, Oral, Daily PRN    olmesartan-hydrochlorothiazide (BENICAR HCT) 40-25 mg per tablet 1 tablet, Oral, Daily        Objective     /87 (BP Location: Right arm, Patient Position: Sitting, BP Method: Large (Automatic))   Pulse 74   " Temp 97.9 °F (36.6 °C) (Oral)   Resp 18   Ht 5' 11" (1.803 m)   Wt (!) 180.6 kg (398 lb 3.2 oz)   LMP 04/19/2024 Comment: currently on menstural cycle  SpO2 96%   BMI 55.54 kg/m²     Physical Exam  Vitals and nursing note reviewed.   Constitutional:       Appearance: She is obese.   HENT:      Head: Atraumatic.      Nose: No congestion.   Eyes:      Conjunctiva/sclera: Conjunctivae normal.   Cardiovascular:      Rate and Rhythm: Normal rate and regular rhythm.      Heart sounds: Normal heart sounds.   Pulmonary:      Effort: No respiratory distress.      Breath sounds: No rales.   Abdominal:      General: Bowel sounds are normal.      Palpations: Abdomen is soft.   Skin:     General: Skin is warm.      Capillary Refill: Capillary refill takes less than 2 seconds.   Neurological:      Mental Status: She is alert and oriented to person, place, and time.   Psychiatric:         Mood and Affect: Mood normal.         Behavior: Behavior normal.          All recently obtained labs have been reviewed and discussed in detail with the patient.   Assessment     1. Hospital discharge follow-up    2. Acute on chronic diastolic (congestive) heart failure    3. Hyperlipidemia, unspecified hyperlipidemia type    4. URTI (acute upper respiratory infection)    5. Morbid obesity with BMI of 50.0-59.9, adult    6. Essential (primary) hypertension    7. Diabetes mellitus without complication    8. Asthma, unspecified asthma severity, unspecified whether complicated, unspecified whether persistent    9. Sleep apnea, unspecified type         Plan     Problem List Items Addressed This Visit          ENT    URTI (acute upper respiratory infection)     Patient has nasal congestion and cough. Denies any acute chest pain, Fever, chills, nausea or vomiting.   Recommended OTC cough syrup.  Ordered Atrovent nasal spray for nasal congestion.         Relevant Medications    ipratropium (ATROVENT) 21 mcg (0.03 %) nasal spray    " dextromethorphan (DELSYM 12 HOUR) 30 mg/5 mL liquid       Pulmonary    Asthma     Continue with Albuterol and Breo inhaler.            Cardiac/Vascular    Essential (primary) hypertension     BP today 133/87 mmHg.  Continue medications as prescribed.         Acute on chronic diastolic (congestive) heart failure     See hospital discharge follow up.         Relevant Medications    aspirin (ECOTRIN) 81 MG EC tablet    metOLazone (ZAROXOLYN) 2.5 MG tablet    Hyperlipidemia    Relevant Medications    atorvastatin (LIPITOR) 40 MG tablet       Endocrine    Morbid obesity with BMI of 50.0-59.9, adult     Body mass index is 55.54 kg/m². Morbid obesity complicates all aspects of disease management from diagnostic modalities to treatment. Weight loss encouraged and health benefits explained to patient.       A Simple Weight Loss Program  Step 1  1. Eat only at mealtime.  2. Eat what you always eat, but one-fourth less.  3. Avoid snacks. Don't buy them. If you don't buy them, you can't eat them.  4. Avoid foods with high-caloric content like desserts (pie, cake, ice cream, cookies).  5. If you drink soft drinks, drink only the ones without sugar. If you don't like them, most people get used to them. Then they don't like the sugary ones (taste too sweet).  6. If you do well and lose 1-2 pounds per week, every two weeks reward yourself with your favorite dessert.  7. By doing the above you will develop good dietary habits that will stay with you for a lifetime.  Step 2  Once you have been successful with step 1 (losing 1-2 pounds per week for a month), start a physical fitness program, by walking for 30 minutes five time per week.  Step 3  Once you are successful with steps 2 and step 2, start working on eating a healthy diet.                             Diabetes mellitus without complication     Continue with Farxiga, Metformin.            Other    Hospital discharge follow-up - Primary     The patient seen for hospital  discharge follow up.  - Patient was admitted for 3 days from 4/19/24 to 4/22/24 for management of acute exacerbation of CHF. -As per documentation, patient was managed with IV Lasix, was seen by Cardiology was discharged after improvement on symptoms.   -Home and discharge medications were reviewed.   -Patient was discharged and started on meds- Aspirin 81mg PO daily, Atorvastatin 40mg PO daily, Metolazone 2.5mg PO daily as needed and Farxiga 10mg PO daily.  - Patient has not taking Aspirin, Atorvastatin and Metolazone and states she have never received the prescription from pharmacy. We will send Aspirin, Atorvastatin and Metolazone to the pharmacy today. -The patient has Mildred Castillo NP as her PCP and states that she will follow up with PCP soon.   -Patient has scheduled Cardiology follow up on 05/2024 and is waiting appointment with Neurology ( Dr. Milian) for evaluation on Migraine.         Sleep apnea     As per patient, she was using CPAP earlier, then later started using BIPAP.  Continue with BIPAP at home.              All orders:  Orders Placed This Encounter    aspirin (ECOTRIN) 81 MG EC tablet    atorvastatin (LIPITOR) 40 MG tablet    metOLazone (ZAROXOLYN) 2.5 MG tablet    ipratropium (ATROVENT) 21 mcg (0.03 %) nasal spray    dextromethorphan (DELSYM 12 HOUR) 30 mg/5 mL liquid          Follow up if symptoms worsen or fail to improve.    Instructed patient that if symptoms fail to improve or worsen patient should seek immediate medical attention or report to the nearest emergency department. Patient expressed verbal agreement and understanding to this plan of care.   Nette Linda MD  Family Medicine Residency PGY-1    [facility]

## 2024-04-30 NOTE — ASSESSMENT & PLAN NOTE
Patient has nasal congestion and cough. Denies any acute chest pain, Fever, chills, nausea or vomiting.   Recommended OTC cough syrup.  Ordered Atrovent nasal spray for nasal congestion.

## 2024-05-10 NOTE — ASSESSMENT & PLAN NOTE
Body mass index is 54.25 kg/m². Morbid obesity complicates all aspects of disease management from diagnostic modalities to treatment. Weight loss encouraged and health benefits explained to patient.  Weight increase over forty pounds over last six months due to limitation of activity due to low back pain        none

## 2024-07-25 ENCOUNTER — HOSPITAL ENCOUNTER (OUTPATIENT)
Facility: HOSPITAL | Age: 44
Discharge: HOME | End: 2024-07-26
Attending: EMERGENCY MEDICINE | Admitting: INTERNAL MEDICINE
Payer: MEDICAID

## 2024-07-25 DIAGNOSIS — G47.33 OBSTRUCTIVE SLEEP APNEA SYNDROME: ICD-10-CM

## 2024-07-25 DIAGNOSIS — E11.69 DIABETES MELLITUS TYPE 2 IN OBESE: ICD-10-CM

## 2024-07-25 DIAGNOSIS — E66.01 MORBID OBESITY WITH BMI OF 50.0-59.9, ADULT: ICD-10-CM

## 2024-07-25 DIAGNOSIS — R06.02 SOB (SHORTNESS OF BREATH): ICD-10-CM

## 2024-07-25 DIAGNOSIS — E66.9 DIABETES MELLITUS TYPE 2 IN OBESE: ICD-10-CM

## 2024-07-25 DIAGNOSIS — R79.89 ELEVATED TROPONIN LEVEL NOT DUE MYOCARDIAL INFARCTION: Primary | ICD-10-CM

## 2024-07-25 DIAGNOSIS — J45.909 ASTHMA, UNSPECIFIED ASTHMA SEVERITY, UNSPECIFIED WHETHER COMPLICATED, UNSPECIFIED WHETHER PERSISTENT: ICD-10-CM

## 2024-07-25 DIAGNOSIS — R79.89 ELEVATED TROPONIN: ICD-10-CM

## 2024-07-25 DIAGNOSIS — I34.0 MITRAL VALVE INSUFFICIENCY, UNSPECIFIED ETIOLOGY: ICD-10-CM

## 2024-07-25 LAB
ALBUMIN SERPL BCP-MCNC: 3.4 G/DL (ref 3.5–5)
ALBUMIN/GLOB SERPL: 0.9 {RATIO}
ALP SERPL-CCNC: 44 U/L (ref 39–100)
ALT SERPL W P-5'-P-CCNC: 19 U/L (ref 13–56)
ANION GAP SERPL CALCULATED.3IONS-SCNC: 7 MMOL/L (ref 7–16)
AST SERPL W P-5'-P-CCNC: 11 U/L (ref 15–37)
BASOPHILS # BLD AUTO: 0.03 K/UL (ref 0–0.2)
BASOPHILS NFR BLD AUTO: 0.5 % (ref 0–1)
BILIRUB SERPL-MCNC: 0.8 MG/DL (ref ?–1.2)
BUN SERPL-MCNC: 12 MG/DL (ref 7–18)
BUN/CREAT SERPL: 11 (ref 6–20)
CALCIUM SERPL-MCNC: 8.4 MG/DL (ref 8.5–10.1)
CHLORIDE SERPL-SCNC: 107 MMOL/L (ref 98–107)
CO2 SERPL-SCNC: 28 MMOL/L (ref 21–32)
CREAT SERPL-MCNC: 1.1 MG/DL (ref 0.55–1.02)
DIFFERENTIAL METHOD BLD: ABNORMAL
EGFR (NO RACE VARIABLE) (RUSH/TITUS): 63 ML/MIN/1.73M2
EOSINOPHIL # BLD AUTO: 0.18 K/UL (ref 0–0.5)
EOSINOPHIL NFR BLD AUTO: 2.7 % (ref 1–4)
ERYTHROCYTE [DISTWIDTH] IN BLOOD BY AUTOMATED COUNT: 18 % (ref 11.5–14.5)
GLOBULIN SER-MCNC: 3.7 G/DL (ref 2–4)
GLUCOSE SERPL-MCNC: 133 MG/DL (ref 74–106)
GLUCOSE SERPL-MCNC: 137 MG/DL (ref 70–105)
HCT VFR BLD AUTO: 36 % (ref 38–47)
HGB BLD-MCNC: 10.7 G/DL (ref 12–16)
IMM GRANULOCYTES # BLD AUTO: 0.03 K/UL (ref 0–0.04)
IMM GRANULOCYTES NFR BLD: 0.5 % (ref 0–0.4)
INFLUENZA A MOLECULAR (OHS): NEGATIVE
INFLUENZA B MOLECULAR (OHS): NEGATIVE
LYMPHOCYTES # BLD AUTO: 2.29 K/UL (ref 1–4.8)
LYMPHOCYTES NFR BLD AUTO: 34.4 % (ref 27–41)
MCH RBC QN AUTO: 23.2 PG (ref 27–31)
MCHC RBC AUTO-ENTMCNC: 29.7 G/DL (ref 32–36)
MCV RBC AUTO: 77.9 FL (ref 80–96)
MONOCYTES # BLD AUTO: 0.63 K/UL (ref 0–0.8)
MONOCYTES NFR BLD AUTO: 9.5 % (ref 2–6)
MPC BLD CALC-MCNC: 10.4 FL (ref 9.4–12.4)
NEUTROPHILS # BLD AUTO: 3.49 K/UL (ref 1.8–7.7)
NEUTROPHILS NFR BLD AUTO: 52.4 % (ref 53–65)
NRBC # BLD AUTO: 0 X10E3/UL
NRBC, AUTO (.00): 0 %
NT-PROBNP SERPL-MCNC: 15 PG/ML (ref 1–125)
PLATELET # BLD AUTO: 317 K/UL (ref 150–400)
POTASSIUM SERPL-SCNC: 3.4 MMOL/L (ref 3.5–5.1)
PROT SERPL-MCNC: 7.1 G/DL (ref 6.4–8.2)
RBC # BLD AUTO: 4.62 M/UL (ref 4.2–5.4)
SARS-COV-2 RDRP RESP QL NAA+PROBE: NEGATIVE
SODIUM SERPL-SCNC: 139 MMOL/L (ref 136–145)
TROPONIN I SERPL DL<=0.01 NG/ML-MCNC: 69.4 PG/ML
WBC # BLD AUTO: 6.65 K/UL (ref 4.5–11)

## 2024-07-25 PROCEDURE — 25000003 PHARM REV CODE 250: Performed by: NURSE PRACTITIONER

## 2024-07-25 PROCEDURE — 87502 INFLUENZA DNA AMP PROBE: CPT | Performed by: NURSE PRACTITIONER

## 2024-07-25 PROCEDURE — 80053 COMPREHEN METABOLIC PANEL: CPT | Performed by: NURSE PRACTITIONER

## 2024-07-25 PROCEDURE — 83880 ASSAY OF NATRIURETIC PEPTIDE: CPT | Performed by: NURSE PRACTITIONER

## 2024-07-25 PROCEDURE — 99285 EMERGENCY DEPT VISIT HI MDM: CPT | Mod: 25

## 2024-07-25 PROCEDURE — 63600175 PHARM REV CODE 636 W HCPCS: Performed by: NURSE PRACTITIONER

## 2024-07-25 PROCEDURE — 85025 COMPLETE CBC W/AUTO DIFF WBC: CPT | Performed by: NURSE PRACTITIONER

## 2024-07-25 PROCEDURE — 96374 THER/PROPH/DIAG INJ IV PUSH: CPT

## 2024-07-25 PROCEDURE — 93010 ELECTROCARDIOGRAM REPORT: CPT | Mod: ,,, | Performed by: HOSPITALIST

## 2024-07-25 PROCEDURE — 87635 SARS-COV-2 COVID-19 AMP PRB: CPT | Performed by: NURSE PRACTITIONER

## 2024-07-25 PROCEDURE — 82962 GLUCOSE BLOOD TEST: CPT

## 2024-07-25 PROCEDURE — 83735 ASSAY OF MAGNESIUM: CPT | Performed by: HOSPITALIST

## 2024-07-25 PROCEDURE — 84484 ASSAY OF TROPONIN QUANT: CPT | Performed by: NURSE PRACTITIONER

## 2024-07-25 PROCEDURE — 93005 ELECTROCARDIOGRAM TRACING: CPT

## 2024-07-25 PROCEDURE — 96375 TX/PRO/DX INJ NEW DRUG ADDON: CPT

## 2024-07-25 RX ORDER — ACETAMINOPHEN 500 MG
1000 TABLET ORAL
Status: COMPLETED | OUTPATIENT
Start: 2024-07-25 | End: 2024-07-25

## 2024-07-25 RX ORDER — MORPHINE SULFATE 2 MG/ML
2 INJECTION, SOLUTION INTRAMUSCULAR; INTRAVENOUS
Status: COMPLETED | OUTPATIENT
Start: 2024-07-25 | End: 2024-07-25

## 2024-07-25 RX ORDER — FUROSEMIDE 10 MG/ML
60 INJECTION INTRAMUSCULAR; INTRAVENOUS
Status: COMPLETED | OUTPATIENT
Start: 2024-07-25 | End: 2024-07-25

## 2024-07-25 RX ADMIN — ACETAMINOPHEN 1000 MG: 500 TABLET ORAL at 11:07

## 2024-07-25 RX ADMIN — FUROSEMIDE 60 MG: 10 INJECTION, SOLUTION INTRAMUSCULAR; INTRAVENOUS at 11:07

## 2024-07-25 RX ADMIN — MORPHINE SULFATE 2 MG: 2 INJECTION, SOLUTION INTRAMUSCULAR; INTRAVENOUS at 11:07

## 2024-07-26 VITALS
TEMPERATURE: 98 F | RESPIRATION RATE: 18 BRPM | OXYGEN SATURATION: 99 % | HEIGHT: 71 IN | SYSTOLIC BLOOD PRESSURE: 119 MMHG | BODY MASS INDEX: 41.02 KG/M2 | WEIGHT: 293 LBS | HEART RATE: 67 BPM | DIASTOLIC BLOOD PRESSURE: 76 MMHG

## 2024-07-26 PROBLEM — R79.89 ELEVATED TROPONIN LEVEL NOT DUE MYOCARDIAL INFARCTION: Status: ACTIVE | Noted: 2024-07-26

## 2024-07-26 PROBLEM — I21.A1 TYPE 2 MYOCARDIAL INFARCTION: Status: ACTIVE | Noted: 2024-07-26

## 2024-07-26 PROBLEM — R07.89 ATYPICAL CHEST PAIN: Status: ACTIVE | Noted: 2024-07-26

## 2024-07-26 LAB
APTT PPP: 29.6 SECONDS (ref 25.2–37.3)
B-HCG UR QL: NEGATIVE
CTP QC/QA: YES
EST. AVERAGE GLUCOSE BLD GHB EST-MCNC: 134 MG/DL
GLUCOSE SERPL-MCNC: 122 MG/DL (ref 70–105)
GLUCOSE SERPL-MCNC: 132 MG/DL (ref 70–105)
HBA1C MFR BLD HPLC: 6.3 % (ref 4.5–6.6)
INR BLD: 1.06
MAGNESIUM SERPL-MCNC: 1.9 MG/DL (ref 1.7–2.3)
OHS QRS DURATION: 84 MS
OHS QTC CALCULATION: 432 MS
PROTHROMBIN TIME: 13.7 SECONDS (ref 11.7–14.7)
TROPONIN I SERPL DL<=0.01 NG/ML-MCNC: 59.2 PG/ML
TROPONIN I SERPL DL<=0.01 NG/ML-MCNC: 79.7 PG/ML

## 2024-07-26 PROCEDURE — 83036 HEMOGLOBIN GLYCOSYLATED A1C: CPT | Performed by: GENERAL PRACTICE

## 2024-07-26 PROCEDURE — 94640 AIRWAY INHALATION TREATMENT: CPT

## 2024-07-26 PROCEDURE — 25000242 PHARM REV CODE 250 ALT 637 W/ HCPCS: Performed by: HOSPITALIST

## 2024-07-26 PROCEDURE — 25000003 PHARM REV CODE 250: Performed by: EMERGENCY MEDICINE

## 2024-07-26 PROCEDURE — 99222 1ST HOSP IP/OBS MODERATE 55: CPT | Mod: ,,, | Performed by: HOSPITALIST

## 2024-07-26 PROCEDURE — 82962 GLUCOSE BLOOD TEST: CPT

## 2024-07-26 PROCEDURE — 84484 ASSAY OF TROPONIN QUANT: CPT | Performed by: GENERAL PRACTICE

## 2024-07-26 PROCEDURE — 36415 COLL VENOUS BLD VENIPUNCTURE: CPT | Performed by: GENERAL PRACTICE

## 2024-07-26 PROCEDURE — 99900035 HC TECH TIME PER 15 MIN (STAT)

## 2024-07-26 PROCEDURE — 85610 PROTHROMBIN TIME: CPT | Performed by: GENERAL PRACTICE

## 2024-07-26 PROCEDURE — 81025 URINE PREGNANCY TEST: CPT | Performed by: EMERGENCY MEDICINE

## 2024-07-26 PROCEDURE — G0378 HOSPITAL OBSERVATION PER HR: HCPCS

## 2024-07-26 PROCEDURE — 99900031 HC PATIENT EDUCATION (STAT)

## 2024-07-26 PROCEDURE — 85730 THROMBOPLASTIN TIME PARTIAL: CPT | Performed by: GENERAL PRACTICE

## 2024-07-26 PROCEDURE — 94761 N-INVAS EAR/PLS OXIMETRY MLT: CPT

## 2024-07-26 PROCEDURE — 25000003 PHARM REV CODE 250: Performed by: GENERAL PRACTICE

## 2024-07-26 RX ORDER — IPRATROPIUM BROMIDE AND ALBUTEROL SULFATE 2.5; .5 MG/3ML; MG/3ML
3 SOLUTION RESPIRATORY (INHALATION) EVERY 12 HOURS
Status: DISCONTINUED | OUTPATIENT
Start: 2024-07-26 | End: 2024-07-26 | Stop reason: HOSPADM

## 2024-07-26 RX ORDER — INSULIN ASPART 100 [IU]/ML
0-10 INJECTION, SOLUTION INTRAVENOUS; SUBCUTANEOUS EVERY 6 HOURS PRN
Status: DISCONTINUED | OUTPATIENT
Start: 2024-07-26 | End: 2024-07-26 | Stop reason: HOSPADM

## 2024-07-26 RX ORDER — BUDESONIDE 0.5 MG/2ML
0.5 INHALANT ORAL EVERY 12 HOURS
Status: DISCONTINUED | OUTPATIENT
Start: 2024-07-26 | End: 2024-07-26 | Stop reason: HOSPADM

## 2024-07-26 RX ORDER — GLUCAGON 1 MG
1 KIT INJECTION
Status: DISCONTINUED | OUTPATIENT
Start: 2024-07-26 | End: 2024-07-26 | Stop reason: HOSPADM

## 2024-07-26 RX ORDER — ASPIRIN 81 MG/1
81 TABLET ORAL DAILY
Status: DISCONTINUED | OUTPATIENT
Start: 2024-07-26 | End: 2024-07-26 | Stop reason: HOSPADM

## 2024-07-26 RX ORDER — ATORVASTATIN CALCIUM 40 MG/1
40 TABLET, FILM COATED ORAL DAILY
Status: DISCONTINUED | OUTPATIENT
Start: 2024-07-26 | End: 2024-07-26 | Stop reason: HOSPADM

## 2024-07-26 RX ORDER — ALBUTEROL SULFATE 0.83 MG/ML
2.5 SOLUTION RESPIRATORY (INHALATION) EVERY 4 HOURS PRN
Status: DISCONTINUED | OUTPATIENT
Start: 2024-07-26 | End: 2024-07-26 | Stop reason: HOSPADM

## 2024-07-26 RX ORDER — CARVEDILOL 25 MG/1
25 TABLET ORAL 2 TIMES DAILY
Status: DISCONTINUED | OUTPATIENT
Start: 2024-07-26 | End: 2024-07-26 | Stop reason: HOSPADM

## 2024-07-26 RX ORDER — POTASSIUM CHLORIDE 20 MEQ/1
40 TABLET, EXTENDED RELEASE ORAL
Status: COMPLETED | OUTPATIENT
Start: 2024-07-26 | End: 2024-07-26

## 2024-07-26 RX ORDER — LOSARTAN POTASSIUM 50 MG/1
50 TABLET ORAL DAILY
Status: DISCONTINUED | OUTPATIENT
Start: 2024-07-26 | End: 2024-07-26 | Stop reason: HOSPADM

## 2024-07-26 RX ORDER — PANTOPRAZOLE SODIUM 40 MG/1
40 TABLET, DELAYED RELEASE ORAL
Qty: 30 TABLET | Refills: 0 | Status: SHIPPED | OUTPATIENT
Start: 2024-07-26 | End: 2024-08-25

## 2024-07-26 RX ADMIN — ATORVASTATIN CALCIUM 40 MG: 40 TABLET, FILM COATED ORAL at 08:07

## 2024-07-26 RX ADMIN — ASPIRIN 81 MG: 81 TABLET, COATED ORAL at 08:07

## 2024-07-26 RX ADMIN — POTASSIUM CHLORIDE 40 MEQ: 1500 TABLET, EXTENDED RELEASE ORAL at 02:07

## 2024-07-26 RX ADMIN — BUDESONIDE 0.5 MG: 0.5 INHALANT RESPIRATORY (INHALATION) at 07:07

## 2024-07-26 RX ADMIN — CARVEDILOL 25 MG: 25 TABLET, FILM COATED ORAL at 08:07

## 2024-07-26 RX ADMIN — IPRATROPIUM BROMIDE AND ALBUTEROL SULFATE 3 ML: .5; 3 SOLUTION RESPIRATORY (INHALATION) at 07:07

## 2024-07-26 RX ADMIN — LOSARTAN POTASSIUM 50 MG: 50 TABLET, FILM COATED ORAL at 08:07

## 2024-07-26 NOTE — CONSULTS
[facility] 2021 reviewed by Dr. Leal, discussed with Dr. Rice, consult cancelled and patient scheduled to follow up with cardiology as outpatient in 2 weeks.

## 2024-07-26 NOTE — ED TRIAGE NOTES
"Pt presents cc of SOB that started when she woke up this morning. Pt states she has not taken meds today because she feels "overmedicated."   "

## 2024-07-26 NOTE — PLAN OF CARE
[facility] Tele[facility]  Initial Discharge Assessment       Primary Care Provider: Nette Linda MD    Admission Diagnosis: SOB (shortness of breath) [R06.02]  Elevated troponin [R79.89]    Admission Date: 7/25/2024  Expected Discharge Date: 7/26/2024    Transition of Care Barriers: None    Payor: MEDICAID [place] / Plan: MEDICA[place] [facility] [place] / Product Type: Managed Medicaid /     No emergency contact information on file.    Discharge Plan A: Home with family  Discharge Plan B: Home with family      [facility] [place] [address]  [address]  [place] [address]  Phone: [phone] Fax: [phone]    [id number] - [place], [place] - [address] AT [facility] [address]  [address]  [place] [address]  Phone: [phone] Fax: [phone]    [facility]  [address] [facility] [place] [address]  Phone: [phone] Fax: [phone]      Initial Assessment (most recent)       Adult Discharge Assessment - 07/26/24 0948          Discharge Assessment    Assessment Type Discharge Planning Assessment     Source of Information patient     People in Home child(jaclyn), adult;child(jaclyn), dependent     Do you expect to return to your current living situation? Yes     Do you have help at home or someone to help you manage your care at home? Yes     Who are your caregiver(s) and their phone number(s)? daughter     Prior to hospitilization cognitive status: Alert/Oriented     Current cognitive status: Alert/Oriented     Walking or Climbing Stairs Difficulty no     Dressing/Bathing Difficulty no     Home Accessibility stairs to enter home     Number of Stairs, Main Entrance other (see comments)   2 flights of stairs    Equipment Currently Used at Home oxygen;nebulizer;CPAP     Patient currently being followed by outpatient case management? No     Do you currently have service(s) that help you  manage your care at home? No     Do you take prescription medications? Yes     Do you have prescription coverage? Yes     Do you have any problems affording any of your prescribed medications? No     Who is going to help you get home at discharge? daughter     How do you get to doctors appointments? car, drives self;family or friend will provide     Are you on dialysis? No     Do you take coumadin? No     Discharge Plan A Home with family     Discharge Plan B Home with family     DME Needed Upon Discharge  none     Discharge Plan discussed with: Patient     Transition of Care Barriers None        Physical Activity    On average, how many days per week do you engage in moderate to strenuous exercise (like a brisk walk)? 0 days     On average, how many minutes do you engage in exercise at this level? 0 min        Financial Resource Strain    How hard is it for you to pay for the very basics like food, housing, medical care, and heating? Not hard at all        Housing Stability    In the last 12 months, was there a time when you were not able to pay the mortgage or rent on time? No     At any time in the past 12 months, were you homeless or living in a shelter (including now)? No        Transportation Needs    Has the lack of transportation kept you from medical appointments, meetings, work or from getting things needed for daily living? No        Food Insecurity    Within the past 12 months, you worried that your food would run out before you got the money to buy more. Never true     Within the past 12 months, the food you bought just didn't last and you didn't have money to get more. Never true        Stress    Do you feel stress - tense, restless, nervous, or anxious, or unable to sleep at night because your mind is troubled all the time - these days? Not at all        Social Isolation    How often do you feel lonely or isolated from those around you?  Never        Alcohol Use    Q1: How often do you have a drink  containing alcohol? Never     Q2: How many drinks containing alcohol do you have on a typical day when you are drinking? Patient does not drink     Q3: How often do you have six or more drinks on one occasion? Never        Utilities    In the past 12 months has the electric, gas, oil, or water company threatened to shut off services in your home? No        Health Literacy    How often do you need to have someone help you when you read instructions, pamphlets, or other written material from your doctor or pharmacy? Never        OTHER    Name(s) of People in Home independant and dependant children                 Pt lives at home with her dep/indep children, no hh pta, has o2, cpap, and nebs, sdoh updated, dc plan home, following for needs

## 2024-07-26 NOTE — ASSESSMENT & PLAN NOTE
Suspect GI in nature.  Reports mild dysphagia, no other red flags, mild anemia.   Trial of PPI.   Recommend OP GI follow up for possible EGD and further evaluation.

## 2024-07-26 NOTE — DISCHARGE SUMMARY
[facility] Hospital Medicine  Discharge Summary      Patient Name: Padmini Gaspar  MRN: [id number]  [facility]: [id number]  Patient Class: OP- Observation  Admission Date: 7/25/2024  Hospital Length of Stay: 0 days  Discharge Date and Time:  07/26/2024 10:48 AM  Attending Physician: Neno Rice MD   Discharging Provider: NENO RICE MD  Primary Care Provider: Nette Linda MD    Primary Care Team: Networked reference to record PCT     HPI:   Padmini Gaspar is a 44 y/o female who presents to the ED with complaint of chest pain, shortness of breath, and headache. Patient reports that she has been experiencing intermittent chest pain for the past week. She describes chest pain as dull, sometimes sharp localized to the center of her chest without radiation. She states the chest pain is exacerbated by physical activity. She reports that the pain was rated a 9/10 in severity when she woke up this morning. Patient was hospitalized 3 months ago with similar complaints and was seen by cardiology, Dr. Jacobson. Per chart review she underwent extensive cardiac evaluation, including LHC in 2021, which demonstrated normal coronary arteries, and normal LV systolic function. Last echo in 4/2024 showed an EF of 65-70%. She reports that she also has been experiencing a headache and fatigue. Patient reports that she did not take her medications today due to feeling of being overmedicated. Denies fever, chills, nausea, vomiting, leg edema, cough, abdominal pain, constipation, or diarrhea.     Patient has a past medical history of sleep apnea, essential HTN, DM2 (A1c of 6.8), asthma, mixed HLD, moderate mitral regurgitation, and obesity. Patient is followed by Mildred Castillo NP for primary care.     Upon arrival to the ED, patients vitals were BP of 109/60, temperature of 98, pulse of 94, RR of 15, and SpO2 of 100%. Troponins were elevated at 70 and 80. EKG shows sinus tachycardia without  signs of ST segment changes or T wave changes. Chest x-ray unremarkable. She was given acetaminophen, furosemide, morphine, and potassium chloride. Patient will be admitted to the hospital for further management.    * No surgery found *      Hospital Course:   7/26- D/W cardiology and the pain is non-cardiac. Appears more GI in nature so will have the patient follow up with GI as OP for further evaluation, possibly EGD.      Goals of Care Treatment Preferences:  Code Status: Full Code      Consults:   Consults (From admission, onward)          Status Ordering Provider     Inpatient consult to Cardiology  Once        Provider:  (Not yet assigned)    PRESTON Rodríguez            Other  Atypical chest pain  Suspect GI in nature.  Reports mild dysphagia, no other red flags, mild anemia.   Trial of PPI.   Recommend OP GI follow up for possible EGD and further evaluation.         Final Active Diagnoses:    Diagnosis Date Noted POA    PRINCIPAL PROBLEM:  Type 2 myocardial infarction [I21.A1] 07/26/2024 Yes    Atypical chest pain [R07.89] 07/26/2024 Yes    Diabetes mellitus type 2 in obese [E11.69, E66.9]  Yes    Asthma [J45.909]  Yes    Mixed hyperlipidemia [E78.2]  Yes    Mitral valve insufficiency [I34.0]  Yes    Morbid obesity with BMI of 50.0-59.9, adult [E66.01, Z68.43] 04/19/2024 Not Applicable    Essential (primary) hypertension [I10]  Yes    Sleep apnea [G47.30]  Yes      Problems Resolved During this Admission:       Discharged Condition: fair    Disposition: Home or Self Care    Follow Up:   Follow-up Information       Nette Linda MD. Schedule an appointment as soon as possible for a visit in 2 week(s).    Specialty: Family Medicine  Contact information:  [address] [facility] [address] [phone]               Alana Castillo FNP. Schedule an appointment as soon as possible for a visit in 1 week(s).    Specialties: Gastroenterology, Emergency Medicine  Why: Dysphagia, need for  EGD  Contact information:  [address] [facility] [place] [address]  [phone]               Deyanira Friedman FNP. Schedule an appointment as soon as possible for a visit in 3 week(s).    Specialty: Cardiology  Contact information:  [address] [facility]  [place] [address]  [phone]                           Patient Instructions:   No discharge procedures on file.    Significant Diagnostic Studies: Labs: BMP:   Recent Labs   Lab 07/25/24 2110   *      K 3.4*      CO2 28   BUN 12   CREATININE 1.10*   CALCIUM 8.4*   MG 1.9    and CBC   Recent Labs   Lab 07/25/24 2110   WBC 6.65   HGB 10.7*   HCT 36.0*          Pending Diagnostic Studies:       None           Medications:  Reconciled Home Medications:      Medication List        START taking these medications      pantoprazole 40 MG tablet  Commonly known as: PROTONIX  Take 1 tablet (40 mg total) by mouth before breakfast.            CONTINUE taking these medications      * albuterol 2.5 mg /3 mL (0.083 %) nebulizer solution  Commonly known as: PROVENTIL  Take 2.5 mg by nebulization every 4 (four) hours as needed.     * albuterol 90 mcg/actuation inhaler  Commonly known as: PROVENTIL/VENTOLIN HFA  Inhale 2 puffs into the lungs every 4 (four) hours as needed.     amLODIPine 10 MG tablet  Commonly known as: NORVASC  Take 10 mg by mouth once daily.     aspirin 81 MG EC tablet  Commonly known as: ECOTRIN  Take 1 tablet (81 mg total) by mouth once daily.     atorvastatin 40 MG tablet  Commonly known as: LIPITOR  Take 1 tablet (40 mg total) by mouth once daily.     carvediloL 25 MG tablet  Commonly known as: COREG  Take 25 mg by mouth 2 (two) times daily.     ferrous fumarate-folic acid 324 mg (106mg iron)-1mg Tab  Commonly known as: HEMOCYTE-F  Take 1 tablet by mouth 2 (two) times daily with meals.     fluticasone furoate-vilanteroL 100-25 mcg/dose diskus inhaler  Commonly known as: BREO  Inhale 1 puff into  the lungs once daily.     furosemide 40 MG tablet  Commonly known as: LASIX  Take 40 mg by mouth every morning.     hydrALAZINE 25 MG tablet  Commonly known as: APRESOLINE  Take 25 mg by mouth 3 (three) times daily.     ipratropium 21 mcg (0.03 %) nasal spray  Commonly known as: ATROVENT  2 sprays by Each Nostril route 2 (two) times daily as needed for Rhinitis.     metFORMIN 500 MG tablet  Commonly known as: GLUCOPHAGE  Take 500 mg by mouth 2 (two) times daily with meals.     metOLazone 2.5 MG tablet  Commonly known as: ZAROXOLYN  Take 1 tablet (2.5 mg total) by mouth daily as needed (As needed for SOB and increased leg swelling).     olmesartan-hydrochlorothiazide 40-25 mg per tablet  Commonly known as: BENICAR HCT  Take 1 tablet by mouth once daily.     TRUE METRIX AIR GLUCOSE METER MISC  USE TO CHECK GLUCOSE DAILY     TRUE METRIX GLUCOSE TEST STRIP MISC  USE TO CHECK GLUCOSE DAILY     TRUEPLUS LANCETS 28 gauge Misc  Generic drug: lancets  USE TO CHECK BLOOD SUGAR DAILY           * This list has 2 medication(s) that are the same as other medications prescribed for you. Read the directions carefully, and ask your doctor or other care provider to review them with you.                  Indwelling Lines/Drains at time of discharge:   Lines/Drains/Airways       None                   Time spent on the discharge of patient: 35 minutes         HUGO LOCO MD  Department of Hospital Medicine  [facility]

## 2024-07-26 NOTE — ASSESSMENT & PLAN NOTE
Body mass index is 54.39 kg/m². Morbid obesity complicates all aspects of disease management from diagnostic modalities to treatment. Weight loss encouraged and health benefits explained to patient.

## 2024-07-26 NOTE — ASSESSMENT & PLAN NOTE
Chronic condition. BP is soft at this time. Latest blood pressure and vitals reviewed-     Temp:  [98 °F (36.7 °C)]   Pulse:  []   Resp:  [14-17]   BP: (109-135)/(60-88)   SpO2:  [100 %] .   Home meds for hypertension were reviewed and noted below.   Hypertension Medications               amLODIPine (NORVASC) 10 MG tablet Take 10 mg by mouth once daily.    carvediloL (COREG) 25 MG tablet Take 25 mg by mouth 2 (two) times daily.    furosemide (LASIX) 40 MG tablet Take 40 mg by mouth every morning.    hydrALAZINE (APRESOLINE) 25 MG tablet Take 25 mg by mouth 3 (three) times daily.    metOLazone (ZAROXOLYN) 2.5 MG tablet Take 1 tablet (2.5 mg total) by mouth daily as needed (As needed for SOB and increased leg swelling).    olmesartan-hydrochlorothiazide (BENICAR HCT) 40-25 mg per tablet Take 1 tablet by mouth once daily.            While in the hospital, will manage blood pressure as follows; Adjust home antihypertensive regimen as follows- Continue BB and ARBs. Hold home amlodipine, hydralazine, metolazone and lasix and HCTZ  due to soft BP.    Will utilize p.r.n. blood pressure medication only if patient's blood pressure greater than 180/110 and she develops symptoms such as worsening chest pain or shortness of breath.

## 2024-07-26 NOTE — PLAN OF CARE
[facility] Telemetry  Discharge Final Note    Primary Care Provider: Nette Linda MD    Expected Discharge Date: 7/26/2024    Final Discharge Note (most recent)       Final Note - 07/26/24 1059          Final Note    Assessment Type Final Discharge Note     Anticipated Discharge Disposition Home or Self Care        Post-Acute Status    Discharge Delays None known at this time                     Important Message from Medicare             Contact Info       Nette Linda MD   Specialty: Family Medicine   Relationship: PCP - General    [address]  [facility] [address]   Phone: [phone]       Next Steps: Schedule an appointment as soon as possible for a visit in 2 week(s)    Alana Castillo FNP   Specialty: Gastroenterology, Emergency Medicine    [address] [facility] [address]   Phone: [phone]       Next Steps: Schedule an appointment as soon as possible for a visit in 1 week(s)    Instructions: Dysphagia, need for EGD    Deyanira Friedman FNP   Specialty: Cardiology    [address]  [facility]  [facility] [address]   Phone: [phone]       Next Steps: Schedule an appointment as soon as possible for a visit in 3 week(s)        For dc home today

## 2024-07-26 NOTE — NURSING
Removed IV site, tip intact. Written instructions given to patient. Virtual discharge instructions given via iPad to patient. Telemetry monitoring discontinued per order, and returned to monitoring station.

## 2024-07-26 NOTE — ASSESSMENT & PLAN NOTE
Chronic condition. BP is soft at this time. Latest blood pressure and vitals reviewed-     Temp:  [98 °F (36.7 °C)-98.1 °F (36.7 °C)]   Pulse:  []   Resp:  [14-17]   BP: (109-147)/(60-97)   SpO2:  [94 %-100 %] .   Home meds for hypertension were reviewed and noted below.   Hypertension Medications               amLODIPine (NORVASC) 10 MG tablet Take 10 mg by mouth once daily.    carvediloL (COREG) 25 MG tablet Take 25 mg by mouth 2 (two) times daily.    furosemide (LASIX) 40 MG tablet Take 40 mg by mouth every morning.    hydrALAZINE (APRESOLINE) 25 MG tablet Take 25 mg by mouth 3 (three) times daily.    metOLazone (ZAROXOLYN) 2.5 MG tablet Take 1 tablet (2.5 mg total) by mouth daily as needed (As needed for SOB and increased leg swelling).    olmesartan-hydrochlorothiazide (BENICAR HCT) 40-25 mg per tablet Take 1 tablet by mouth once daily.            While in the hospital, will manage blood pressure as follows; resume home med.   Will utilize p.r.n. blood pressure medication only if patient's blood pressure greater than 180/110 and she develops symptoms such as worsening chest pain or shortness of breath.

## 2024-07-26 NOTE — H&P
[facility] Hospital Medicine  History & Physical    Patient Name: Padmini Gaspar  MRN: [id number]  Patient Class: OP- Observation  Admission Date: 7/25/2024  Attending Physician: Neno Rice MD   Primary Care Provider: Nette Linda MD         Patient information was obtained from patient, past medical records, and ER records.     Subjective:     Principal Problem:Elevated troponin level not due myocardial infarction    Chief Complaint:   Chief Complaint   Patient presents with    Shortness of Breath    Chest Pain        HPI: Padmini Gaspar is a 44 y/o female who presents to the ED with complaint of chest pain, shortness of breath, and headache. Patient reports that she has been experiencing intermittent chest pain for the past week. She describes chest pain as dull, sometimes sharp localized to the center of her chest without radiation. She states the chest pain is exacerbated by physical activity. She reports that the pain was rated a 9/10 in severity when she woke up this morning. Patient was hospitalized 3 months ago with similar complaints and was seen by cardiology, Dr. Jacobson. Per chart review she underwent extensive cardiac evaluation, including LHC in 2021, which demonstrated normal coronary arteries, and normal LV systolic function. Last echo in 4/2024 showed an EF of 65-70%. She reports that she also has been experiencing a headache and fatigue. Patient reports that she did not take her medications today due to feeling of being overmedicated. Denies fever, chills, nausea, vomiting, leg edema, cough, abdominal pain, constipation, or diarrhea.     Patient has a past medical history of sleep apnea, essential HTN, DM2 (A1c of 6.8), asthma, mixed HLD, moderate mitral regurgitation, and obesity. Patient is followed by Mildred Castillo NP for primary care.     Upon arrival to the ED, patients vitals were BP of 109/60, temperature of 98, pulse of 94, RR of 15, and  SpO2 of 100%. Troponins were elevated at 70 and 80. EKG shows sinus tachycardia without signs of ST segment changes or T wave changes. Chest x-ray unremarkable. She was given acetaminophen, furosemide, morphine, and potassium chloride. Patient will be admitted to the hospital for further management.    Past Medical History:   Diagnosis Date    Asthma     Diabetes mellitus type 2 in obese     Essential (primary) hypertension     Mixed hyperlipidemia     Moderate mitral regurgitation by prior echocardiogram     Morbid obesity with BMI of 50.0-59.9, adult     Sleep apnea        Past Surgical History:   Procedure Laterality Date     SECTION      LEFT HEART CATHETERIZATION Right 2021    Procedure: Left heart cath;  Surgeon: Isaías Darden MD;  Location: [facility] CATH LAB;  Service: Cardiology;  Laterality: Right;       Review of patient's allergies indicates:  No Known Allergies    No current facility-administered medications on file prior to encounter.     Current Outpatient Medications on File Prior to Encounter   Medication Sig    albuterol (PROVENTIL) 2.5 mg /3 mL (0.083 %) nebulizer solution Take 2.5 mg by nebulization every 4 (four) hours as needed.    albuterol (PROVENTIL/VENTOLIN HFA) 90 mcg/actuation inhaler Inhale 2 puffs into the lungs every 4 (four) hours as needed.    amLODIPine (NORVASC) 10 MG tablet Take 10 mg by mouth once daily.    carvediloL (COREG) 25 MG tablet Take 25 mg by mouth 2 (two) times daily.    ferrous fumarate-folic acid 324 mg, 106mg iron,-1mg (HEMOCYTE-F) Tab Take 1 tablet by mouth 2 (two) times daily with meals.    fluticasone furoate-vilanteroL (BREO) 100-25 mcg/dose diskus inhaler Inhale 1 puff into the lungs once daily.    furosemide (LASIX) 40 MG tablet Take 40 mg by mouth every morning.    hydrALAZINE (APRESOLINE) 25 MG tablet Take 25 mg by mouth 3 (three) times daily.    lancets (TRUEPLUS LANCETS) 28 gauge Misc USE TO CHECK BLOOD SUGAR DAILY    metFORMIN  (GLUCOPHAGE) 500 MG tablet Take 500 mg by mouth 2 (two) times daily with meals.    olmesartan-hydrochlorothiazide (BENICAR HCT) 40-25 mg per tablet Take 1 tablet by mouth once daily.    aspirin (ECOTRIN) 81 MG EC tablet Take 1 tablet (81 mg total) by mouth once daily.    atorvastatin (LIPITOR) 40 MG tablet Take 1 tablet (40 mg total) by mouth once daily.    blood sugar diagnostic (TRUE METRIX GLUCOSE TEST STRIP MISC) USE TO CHECK GLUCOSE DAILY (Patient not taking: Reported on 4/30/2024)    blood-glucose meter (TRUE METRIX AIR GLUCOSE METER MISC) USE TO CHECK GLUCOSE DAILY (Patient not taking: Reported on 4/30/2024)    ipratropium (ATROVENT) 21 mcg (0.03 %) nasal spray 2 sprays by Each Nostril route 2 (two) times daily as needed for Rhinitis.    metOLazone (ZAROXOLYN) 2.5 MG tablet Take 1 tablet (2.5 mg total) by mouth daily as needed (As needed for SOB and increased leg swelling).     Family History       Problem Relation (Age of Onset)    Diabetes Mother, Father    Heart disease Mother          Tobacco Use    Smoking status: Light Smoker    Smokeless tobacco: Never   Substance and Sexual Activity    Alcohol use: Not Currently    Drug use: Not Currently    Sexual activity: Yes     Partners: Male     Review of Systems   Constitutional:  Negative for chills and fever.   HENT:  Negative for hearing loss and trouble swallowing.    Eyes:  Negative for visual disturbance.   Respiratory:  Positive for shortness of breath. Negative for cough.    Cardiovascular:  Positive for chest pain. Negative for leg swelling.   Gastrointestinal:  Negative for abdominal pain, diarrhea, nausea and vomiting.   Genitourinary:  Negative for difficulty urinating and hematuria.   Musculoskeletal:  Negative for myalgias.   Skin:  Negative for rash.   Neurological:  Positive for headaches. Negative for light-headedness.   Psychiatric/Behavioral:  Negative for sleep disturbance.      Objective:     Vital Signs (Most Recent):  Temp: 98 °F (36.7  °C) (07/25/24 2103)  Pulse: 89 (07/26/24 0216)  Resp: 16 (07/26/24 0216)  BP: 112/65 (07/26/24 0216)  SpO2: 100 % (07/26/24 0216) Vital Signs (24h Range):  Temp:  [98 °F (36.7 °C)] 98 °F (36.7 °C)  Pulse:  [] 89  Resp:  [14-17] 16  SpO2:  [100 %] 100 %  BP: (109-135)/(60-88) 112/65     Weight: (!) 176.9 kg (390 lb)  Body mass index is 54.39 kg/m².     Physical Exam  Constitutional:       General: She is not in acute distress.     Appearance: She is obese. She is not toxic-appearing.   HENT:      Head: Normocephalic and atraumatic.      Right Ear: External ear normal.      Left Ear: External ear normal.      Nose: Nose normal.      Mouth/Throat:      Pharynx: Oropharynx is clear.   Eyes:      Extraocular Movements: Extraocular movements intact.      Conjunctiva/sclera: Conjunctivae normal.      Pupils: Pupils are equal, round, and reactive to light.   Cardiovascular:      Rate and Rhythm: Normal rate and regular rhythm.      Pulses: Normal pulses.      Heart sounds: Normal heart sounds.   Pulmonary:      Effort: Pulmonary effort is normal. No respiratory distress.      Breath sounds: Normal breath sounds. No wheezing, rhonchi or rales.   Abdominal:      General: Bowel sounds are normal. There is no distension.      Tenderness: There is no abdominal tenderness. There is no guarding or rebound.   Musculoskeletal:         General: Normal range of motion.      Cervical back: Normal range of motion and neck supple.      Right lower leg: No edema.      Left lower leg: No edema.   Skin:     General: Skin is warm and dry.      Capillary Refill: Capillary refill takes less than 2 seconds.      Findings: No rash.   Neurological:      General: No focal deficit present.      Mental Status: She is alert and oriented to person, place, and time.   Psychiatric:         Mood and Affect: Mood normal.         Behavior: Behavior normal.              CRANIAL NERVES     CN III, IV, VI   Pupils are equal, round, and reactive to  light.       Significant Labs: All pertinent labs within the past 24 hours have been reviewed.    Significant Imaging: I have reviewed all pertinent imaging results/findings within the past 24 hours.    Assessment/Plan:     * Elevated troponin level not due myocardial infarction  Patient with intermittent chest pain. She recently underwent extensive cardiac evaluation, including LHC in 2021, which demonstrated normal coronary arteries, and normal LV systolic function. She was seen by Dr. Jacobson during last hospitalization 3 months ago. Patient is chest pain free at this moment.  Elevated troponin but flat 69.4 and 79.7 it was 88.6 three months ago.  EKG sinus tachycardia, no ST ischemic changes.  Trending troponin  Obtain TSH, PT, INR and PTT.  Cardiac monitoring  NPO  MINA for DVT ppx for now  Monitor vital signs and physical exam.    Echo 4/20/24:    Left Ventricle: The left ventricle is normal in size. Mildly increased wall thickness. Septal thickening. There is normal systolic function with a visually estimated ejection fraction of 65 - 70%. There is normal diastolic function.    Right Ventricle: Normal right ventricular cavity size. Systolic function is normal.    Left Atrium: Left atrium is dilated.    Aortic Valve: The aortic valve is a trileaflet valve.    Mitral Valve: There is mild to moderate regurgitation.           Essential (primary) hypertension  Chronic condition. BP is soft at this time. Latest blood pressure and vitals reviewed-     Temp:  [98 °F (36.7 °C)-98.1 °F (36.7 °C)]   Pulse:  []   Resp:  [14-17]   BP: (109-147)/(60-97)   SpO2:  [94 %-100 %] .   Home meds for hypertension were reviewed and noted below.   Hypertension Medications               amLODIPine (NORVASC) 10 MG tablet Take 10 mg by mouth once daily.    carvediloL (COREG) 25 MG tablet Take 25 mg by mouth 2 (two) times daily.    furosemide (LASIX) 40 MG tablet Take 40 mg by mouth every morning.    hydrALAZINE (APRESOLINE) 25 MG  tablet Take 25 mg by mouth 3 (three) times daily.    metOLazone (ZAROXOLYN) 2.5 MG tablet Take 1 tablet (2.5 mg total) by mouth daily as needed (As needed for SOB and increased leg swelling).    olmesartan-hydrochlorothiazide (BENICAR HCT) 40-25 mg per tablet Take 1 tablet by mouth once daily.            While in the hospital, will manage blood pressure as follows; Adjust home antihypertensive regimen as follows- Continue BB and ARBs. Hold home amlodipine, hydralazine, metolazone, lasix and HCTZ  due to soft BP.    Will utilize p.r.n. blood pressure medication only if patient's blood pressure greater than 180/110 and she develops symptoms such as worsening chest pain or shortness of breath.    Diabetes mellitus type 2 in obese  Patient's FSGs are controlled on current medication regimen.  Last A1c reviewed-   Lab Results   Component Value Date    HGBA1C 6.8 (H) 04/20/2024     Current correctional scale  Medium  Maintain anti-hyperglycemic dose as follows-   Antihyperglycemics (From admission, onward)      Start     Stop Route Frequency Ordered    07/26/24 0353  insulin aspart U-100 injection 0-10 Units         -- SubQ Every 6 hours PRN 07/26/24 0254          Hold Oral hypoglycemics while patient is in the hospital. Repeat A1c.    Sleep apnea  CPAP QHS.      Mixed hyperlipidemia  Lab Results   Component Value Date    CHOL 135 04/20/2024    HDL 36 (L) 04/20/2024    LDLCALC 79 04/20/2024    TRIG 101 04/20/2024   Continue home statin      Asthma  Continue home inhaled steroids and bronchodilator.      Morbid obesity with BMI of 50.0-59.9, adult  Body mass index is 54.39 kg/m². Morbid obesity complicates all aspects of disease management from diagnostic modalities to treatment. Weight loss encouraged and health benefits explained to patient.           VTE Risk Mitigation (From admission, onward)           Ordered     Place MINA hose  Until discontinued         07/26/24 0221                            Tico Rhodes  MD  Department of Hospital Medicine  [facility] [address] [place] [address]

## 2024-07-26 NOTE — ASSESSMENT & PLAN NOTE
Lab Results   Component Value Date    CHOL 135 04/20/2024    HDL 36 (L) 04/20/2024    LDLCALC 79 04/20/2024    TRIG 101 04/20/2024   Continue home statin

## 2024-07-26 NOTE — HOSPITAL COURSE
7/26- D/W cardiology and the pain is non-cardiac. Appears more GI in nature so will have the patient follow up with GI as OP for further evaluation, possibly EGD.

## 2024-07-26 NOTE — HPI
Padmini Gaspar is a 44 y/o female who presents to the ED with complaint of chest pain, shortness of breath, and headache. Patient reports that she has been experiencing intermittent chest pain for the past week. She describes chest pain as dull, sometimes sharp localized to the center of her chest without radiation. She states the chest pain is exacerbated by physical activity. She reports that the pain was rated a 9/10 in severity when she woke up this morning. Patient was hospitalized 3 months ago with similar complaints and was seen by cardiology, Dr. Jacobson. Per chart review she underwent extensive cardiac evaluation, including LHC in 2021, which demonstrated normal coronary arteries, and normal LV systolic function. Last echo in 4/2024 showed an EF of 65-70%. She reports that she also has been experiencing a headache and fatigue. Patient reports that she did not take her medications today due to feeling of being overmedicated. Denies fever, chills, nausea, vomiting, leg edema, cough, abdominal pain, constipation, or diarrhea.     Patient has a past medical history of sleep apnea, essential HTN, DM2 (A1c of 6.8), asthma, mixed HLD, moderate mitral regurgitation, and obesity. Patient is followed by Mildred Castillo NP for primary care.     Upon arrival to the ED, patients vitals were BP of 109/60, temperature of 98, pulse of 94, RR of 15, and SpO2 of 100%. Troponins were elevated at 70 and 80. EKG shows sinus tachycardia without signs of ST segment changes or T wave changes. Chest x-ray unremarkable. She was given acetaminophen, furosemide, morphine, and potassium chloride. Patient will be admitted to the hospital for further management.

## 2024-07-26 NOTE — MEDICAL/APP STUDENT
History     Chief Complaint   Patient presents with    Shortness of Breath   Padmini Gaspar is a 44 yo female who presents with chest pain, shortness of breath, and headache. Patient reports that she has been experiencing the chest pain for a week now and that the pain is localized to the center of her chest without radiation. She notes that the chest pain is mostly dull with episodes that are sharp, comes and goes, and is exacerbated by physical activity. She reports that the pain was rated a 9/10 in severity when she woke up this morning. She reports that she also has been experiencing a headache and fatigue. Denies fever, chills, nausea, vomiting, leg edema, cough, abdominal pain, constipation, or diarrhea. She has not taken her medications today due to the feeling of being overmedicated.     Patient has a past medical history of sleep apnea, essential HTN, DM2 (A1c of 6.8), asthma, mixed HLD, mitral regurgitation, and obesity (BMI of 54). Patient was recently hospitalized about 3 months ago for similar symptoms and an extensive workup was performed by Dr. Jacobson where an echo was performed that showed an EF of 65-70%. A cath was performed on 07/11/21 that showed normal coronary arteries. Patient is followed by Mildred Castillo NP for primary care. Patient reports that she smokes cigarettes.     Upon arrival to the ED, patients vitals were BP of 109/60, temperature of 98, pulse of 94, RR of 15, and SpO2 of 100%. Troponins were elevated at 70 and 80. EKG shows tachycardia without signs of ST segment changes or T wave changes. Chest x-ray unremarkable. Urine pregnancy test was negative. She was given acetaminophen, furosemide, morphine, and potassium chloride. Patient will be admitted for further management.        Past Medical History:   Diagnosis Date    Asthma     Diabetes mellitus type 2 in obese     Essential (primary) hypertension     Mixed hyperlipidemia     Moderate mitral regurgitation by prior echocardiogram  "    Morbid obesity with BMI of 50.0-59.9, adult     Sleep apnea        Past Surgical History:   Procedure Laterality Date     SECTION      LEFT HEART CATHETERIZATION Right 2021    Procedure: Left heart cath;  Surgeon: Isaías Darden MD;  Location: [facility] CATH LAB;  Service: Cardiology;  Laterality: Right;       Family History   Problem Relation Name Age of Onset    Diabetes Mother      Heart disease Mother      Diabetes Father         Social History     Tobacco Use    Smoking status: Light Smoker    Smokeless tobacco: Never   Substance Use Topics    Alcohol use: Not Currently    Drug use: Not Currently       Review of Systems   Constitutional:  Positive for fatigue. Negative for chills, diaphoresis and fever.   Eyes:  Negative for visual disturbance.   Respiratory:  Positive for shortness of breath. Negative for cough.    Cardiovascular:  Positive for chest pain. Negative for leg swelling.   Gastrointestinal:  Negative for abdominal distention, abdominal pain, constipation, diarrhea, nausea and vomiting.   Skin:  Negative for color change and pallor.   Neurological:  Positive for headaches. Negative for syncope.       Physical Exam   /60 (BP Location: Left arm, Patient Position: Lying)   Pulse 94   Temp 98 °F (36.7 °C) (Oral)   Resp 15   Ht 5' 11" (1.803 m)   Wt (!) 176.9 kg (390 lb)   SpO2 100%   Breastfeeding No   BMI 54.39 kg/m²     Physical Exam    Constitutional: Vital signs are normal. She is not diaphoretic. She is Obese . She is cooperative. No distress.   HENT:   Head: Normocephalic and atraumatic.   Eyes: Lids are normal. Right eye exhibits discharge. Left eye exhibits discharge.   Neck: Neck supple. No JVD present.   Normal range of motion.  Cardiovascular:  Normal rate, regular rhythm, S1 normal, S2 normal and normal pulses.           Pulmonary/Chest: Effort normal and breath sounds normal. No respiratory distress.   Abdominal: Abdomen is soft. Bowel sounds are normal. " She exhibits no distension. There is no abdominal tenderness.   Musculoskeletal:      Cervical back: Normal range of motion and neck supple.     Neurological: She is alert.   Skin: Skin is warm, dry and intact.       Assessment and Plan:     Elevation of troponin unrelated to STEMI  Patient presented with chest pain, shortness of breath, and headache. EKG showed sinus tachycardia without ST segment changes or t-wave abnormalities. Troponin: 70 and 80. Chest x-ray unremarkable. LHC in 2021 that showed normal coronary arteries. Echo on 04/20/24 that showed EF of 65-70% and mild mitral regurgitation otherwise unremarkable.   NPO  Consult cardiology   Order INR, APTT, TSH, serial troponin  Continuous telemetry     Essential HTN  Chronic, controlled  Vitals reviewed and are as follows:  Temp: 98  BP: 109/60  Pulse: 94  RR: 15  SpO2: 100%    Continue home medications:   -losartan 50 mg PO daily   -carvedilol 25 mg PO BID   -aspirin 81 mg PO daily  Monitor vitals     Sleep apnea  Controlled  Continue nightly CPAP    DM2  Chronic, controlled (A1c of 6.8 3 months ago)  Order A1c  Moderate sliding scale  POCT glucose Q6h    Asthma  Continue home medications   -albuterol-ipratropium nebulizer   -budesonide nebulizer    Obesity  BMI of 54  Educate patient on healthy diet and exercise     Mixed HLD  Continue home medications   -atorvastatin 40 mg PO nightly

## 2024-07-26 NOTE — ED NOTES
Pt presents to the ED with c/o SOB and intermittent CP that has gotten increasingly worse throughout the day. She reports that she has a hx of asthma, DM, CHF, heart murmur. Denies being exposed to anyone with covid or flu that she is aware of. She is speaking in short, choppy sentences and having to pause for several seconds. She states the CHG dx is new. She denies any N/V/D. She reports that the SOB is worse with exertion. No edema noted to extremities.

## 2024-07-26 NOTE — ASSESSMENT & PLAN NOTE
Patient with intermittent chest pain. She recently underwent extensive cardiac evaluation, including C in 2021, which demonstrated normal coronary arteries, and normal LV systolic function. She was seen by Dr. Jacobson during last hospitalization 3 months ago. Patient is chest pain free at this moment.  Elevated troponin but flat 69.4 and 79.7 it was 88.6 three months ago.  EKG sinus tachycardia, no ST ischemic changes.  Cardiology consulted; case discussed with Dr. Leal and the chest pain seems to be non-cardiac in nature, no further inpatient workup required, recommend OP cardio follow up.     Echo 4/20/24:    Left Ventricle: The left ventricle is normal in size. Mildly increased wall thickness. Septal thickening. There is normal systolic function with a visually estimated ejection fraction of 65 - 70%. There is normal diastolic function.    Right Ventricle: Normal right ventricular cavity size. Systolic function is normal.    Left Atrium: Left atrium is dilated.    Aortic Valve: The aortic valve is a trileaflet valve.    Mitral Valve: There is mild to moderate regurgitation.

## 2024-07-26 NOTE — SUBJECTIVE & OBJECTIVE
Past Medical History:   Diagnosis Date    Asthma     Diabetes mellitus type 2 in obese     Essential (primary) hypertension     Mixed hyperlipidemia     Moderate mitral regurgitation by prior echocardiogram     Morbid obesity with BMI of 50.0-59.9, adult     Sleep apnea        Past Surgical History:   Procedure Laterality Date     SECTION      LEFT HEART CATHETERIZATION Right 2021    Procedure: Left heart cath;  Surgeon: Isaías Darden MD;  Location: [facility] CATH LAB;  Service: Cardiology;  Laterality: Right;       Review of patient's allergies indicates:  No Known Allergies    No current facility-administered medications on file prior to encounter.     Current Outpatient Medications on File Prior to Encounter   Medication Sig    albuterol (PROVENTIL) 2.5 mg /3 mL (0.083 %) nebulizer solution Take 2.5 mg by nebulization every 4 (four) hours as needed.    albuterol (PROVENTIL/VENTOLIN HFA) 90 mcg/actuation inhaler Inhale 2 puffs into the lungs every 4 (four) hours as needed.    amLODIPine (NORVASC) 10 MG tablet Take 10 mg by mouth once daily.    carvediloL (COREG) 25 MG tablet Take 25 mg by mouth 2 (two) times daily.    ferrous fumarate-folic acid 324 mg, 106mg iron,-1mg (HEMOCYTE-F) Tab Take 1 tablet by mouth 2 (two) times daily with meals.    fluticasone furoate-vilanteroL (BREO) 100-25 mcg/dose diskus inhaler Inhale 1 puff into the lungs once daily.    furosemide (LASIX) 40 MG tablet Take 40 mg by mouth every morning.    hydrALAZINE (APRESOLINE) 25 MG tablet Take 25 mg by mouth 3 (three) times daily.    lancets (TRUEPLUS LANCETS) 28 gauge Misc USE TO CHECK BLOOD SUGAR DAILY    metFORMIN (GLUCOPHAGE) 500 MG tablet Take 500 mg by mouth 2 (two) times daily with meals.    olmesartan-hydrochlorothiazide (BENICAR HCT) 40-25 mg per tablet Take 1 tablet by mouth once daily.    aspirin (ECOTRIN) 81 MG EC tablet Take 1 tablet (81 mg total) by mouth once daily.    atorvastatin (LIPITOR) 40 MG tablet Take 1  tablet (40 mg total) by mouth once daily.    blood sugar diagnostic (TRUE METRIX GLUCOSE TEST STRIP MISC) USE TO CHECK GLUCOSE DAILY (Patient not taking: Reported on 4/30/2024)    blood-glucose meter (TRUE METRIX AIR GLUCOSE METER MISC) USE TO CHECK GLUCOSE DAILY (Patient not taking: Reported on 4/30/2024)    ipratropium (ATROVENT) 21 mcg (0.03 %) nasal spray 2 sprays by Each Nostril route 2 (two) times daily as needed for Rhinitis.    metOLazone (ZAROXOLYN) 2.5 MG tablet Take 1 tablet (2.5 mg total) by mouth daily as needed (As needed for SOB and increased leg swelling).     Family History       Problem Relation (Age of Onset)    Diabetes Mother, Father    Heart disease Mother          Tobacco Use    Smoking status: Light Smoker    Smokeless tobacco: Never   Substance and Sexual Activity    Alcohol use: Not Currently    Drug use: Not Currently    Sexual activity: Yes     Partners: Male     Review of Systems   Constitutional:  Negative for chills and fever.   HENT:  Negative for hearing loss and trouble swallowing.    Eyes:  Negative for visual disturbance.   Respiratory:  Positive for shortness of breath. Negative for cough.    Cardiovascular:  Positive for chest pain. Negative for leg swelling.   Gastrointestinal:  Negative for abdominal pain, diarrhea, nausea and vomiting.   Genitourinary:  Negative for difficulty urinating and hematuria.   Musculoskeletal:  Negative for myalgias.   Skin:  Negative for rash.   Neurological:  Positive for headaches. Negative for light-headedness.   Psychiatric/Behavioral:  Negative for sleep disturbance.      Objective:     Vital Signs (Most Recent):  Temp: 98 °F (36.7 °C) (07/25/24 2103)  Pulse: 89 (07/26/24 0216)  Resp: 16 (07/26/24 0216)  BP: 112/65 (07/26/24 0216)  SpO2: 100 % (07/26/24 0216) Vital Signs (24h Range):  Temp:  [98 °F (36.7 °C)] 98 °F (36.7 °C)  Pulse:  [] 89  Resp:  [14-17] 16  SpO2:  [100 %] 100 %  BP: (109-135)/(60-88) 112/65     Weight: (!) 176.9 kg  (390 lb)  Body mass index is 54.39 kg/m².     Physical Exam  Constitutional:       General: She is not in acute distress.     Appearance: She is obese. She is not toxic-appearing.   HENT:      Head: Normocephalic and atraumatic.      Right Ear: External ear normal.      Left Ear: External ear normal.      Nose: Nose normal.      Mouth/Throat:      Pharynx: Oropharynx is clear.   Eyes:      Extraocular Movements: Extraocular movements intact.      Conjunctiva/sclera: Conjunctivae normal.      Pupils: Pupils are equal, round, and reactive to light.   Cardiovascular:      Rate and Rhythm: Normal rate and regular rhythm.      Pulses: Normal pulses.      Heart sounds: Normal heart sounds.   Pulmonary:      Effort: Pulmonary effort is normal. No respiratory distress.      Breath sounds: Normal breath sounds. No wheezing, rhonchi or rales.   Abdominal:      General: Bowel sounds are normal. There is no distension.      Tenderness: There is no abdominal tenderness. There is no guarding or rebound.   Musculoskeletal:         General: Normal range of motion.      Cervical back: Normal range of motion and neck supple.      Right lower leg: No edema.      Left lower leg: No edema.   Skin:     General: Skin is warm and dry.      Capillary Refill: Capillary refill takes less than 2 seconds.      Findings: No rash.   Neurological:      General: No focal deficit present.      Mental Status: She is alert and oriented to person, place, and time.   Psychiatric:         Mood and Affect: Mood normal.         Behavior: Behavior normal.              CRANIAL NERVES     CN III, IV, VI   Pupils are equal, round, and reactive to light.       Significant Labs: All pertinent labs within the past 24 hours have been reviewed.    Significant Imaging: I have reviewed all pertinent imaging results/findings within the past 24 hours.

## 2024-07-26 NOTE — ASSESSMENT & PLAN NOTE
Patient's FSGs are controlled on current medication regimen.  Last A1c reviewed-   Lab Results   Component Value Date    HGBA1C 6.8 (H) 04/20/2024     Current correctional scale  Medium  Maintain anti-hyperglycemic dose as follows-   Antihyperglycemics (From admission, onward)      Start     Stop Route Frequency Ordered    07/26/24 0353  insulin aspart U-100 injection 0-10 Units         -- SubQ Every 6 hours PRN 07/26/24 0254          Hold Oral hypoglycemics while patient is in the hospital. Repeat A1c.

## 2024-07-26 NOTE — ASSESSMENT & PLAN NOTE
Echo    Result Date: 4/20/2024    Left Ventricle: The left ventricle is normal in size. Mildly increased   wall thickness. Septal thickening. There is normal systolic function with   a visually estimated ejection fraction of 65 - 70%. There is normal   diastolic function.    Right Ventricle: Normal right ventricular cavity size. Systolic   function is normal.    Left Atrium: Left atrium is dilated.    Aortic Valve: The aortic valve is a trileaflet valve.    Mitral Valve: There is mild to moderate regurgitation.

## 2024-07-26 NOTE — ASSESSMENT & PLAN NOTE
Body mass index is 54.17 kg/m². Morbid obesity complicates all aspects of disease management from diagnostic modalities to treatment. Weight loss encouraged and health benefits explained to patient.

## 2024-07-26 NOTE — ASSESSMENT & PLAN NOTE
Patient with intermittent chest pain. She recently underwent extensive cardiac evaluation, including C in 2021, which demonstrated normal coronary arteries, and normal LV systolic function. She was seen by Dr. Jacobson during last hospitalization 3 months ago. Patient is chest pain free at this moment.  Elevated troponin but flat 69.4 and 79.7 it was 88.6 three months ago.  EKG sinus tachycardia, no ST ischemic changes.  Trending troponin  Obtain TSH, PT, INR and PTT.  Cardiac monitoring  NPO  MINA for DVT ppx for now  Monitor vital signs and physical exam.    Echo 4/20/24:    Left Ventricle: The left ventricle is normal in size. Mildly increased wall thickness. Septal thickening. There is normal systolic function with a visually estimated ejection fraction of 65 - 70%. There is normal diastolic function.    Right Ventricle: Normal right ventricular cavity size. Systolic function is normal.    Left Atrium: Left atrium is dilated.    Aortic Valve: The aortic valve is a trileaflet valve.    Mitral Valve: There is mild to moderate regurgitation.

## 2024-07-26 NOTE — ASSESSMENT & PLAN NOTE
Patient's FSGs are controlled on current medication regimen.  Last A1c reviewed-   Lab Results   Component Value Date    HGBA1C 6.3 07/26/2024     Current correctional scale  Medium  Maintain anti-hyperglycemic dose as follows-   Antihyperglycemics (From admission, onward)    Start     Stop Route Frequency Ordered    07/26/24 0353  insulin aspart U-100 injection 0-10 Units         -- SubQ Every 6 hours PRN 07/26/24 0254        Hold Oral hypoglycemics while patient is in the hospital. Repeat A1c.

## 2024-07-26 NOTE — ASSESSMENT & PLAN NOTE
Suspect GI in nature.  Reports mild dysphagia, no other red flags, mild anemia.   Recommend OP GI follow up for possible EGD and further evaluation.

## 2024-07-26 NOTE — ED PROVIDER NOTES
"Encounter Date: 2024       History     Chief Complaint   Patient presents with    Shortness of Breath    Chest Pain     43 year old female presents to ED with complaint of chest pain, shortness of breath, and headache. Patient reports symptoms started this morning when she woke up. She states headache feels like a band wrapped around her head. Denies visual changes, nausea/vomiting. She reports she started experiencing chest pain to center of her chest and shortness of breath. She states with movement, her arms feel "heavy". Patient did not take morning medications due to not feeling well. Patient with PMH of CHF, DM, asthma, HLD, MVR, BJ, sleep apnea. Patient uses oxygen at home at 2L [place]    The history is provided by the patient.     Review of patient's allergies indicates:  No Known Allergies  Past Medical History:   Diagnosis Date    Asthma     Diabetes mellitus type 2 in obese     Essential (primary) hypertension     Mixed hyperlipidemia     Moderate mitral regurgitation by prior echocardiogram     Morbid obesity with BMI of 50.0-59.9, adult     Sleep apnea      Past Surgical History:   Procedure Laterality Date     SECTION      LEFT HEART CATHETERIZATION Right 2021    Procedure: Left heart cath;  Surgeon: Isaías Darden MD;  Location: [facility] CATH LAB;  Service: Cardiology;  Laterality: Right;     Family History   Problem Relation Name Age of Onset    Diabetes Mother      Heart disease Mother      Diabetes Father       Social History     Tobacco Use    Smoking status: Light Smoker    Smokeless tobacco: Never   Substance Use Topics    Alcohol use: Not Currently    Drug use: Not Currently     Review of Systems   Constitutional:  Negative for chills and fever.   HENT:  Negative for sinus pressure and sinus pain.    Eyes:  Negative for photophobia and visual disturbance.   Respiratory:  Positive for shortness of breath. Negative for cough.    Cardiovascular:  Positive for chest pain. " Negative for palpitations.   Gastrointestinal:  Negative for nausea and vomiting.   Musculoskeletal:  Negative for arthralgias and gait problem.   Skin:  Negative for color change and wound.   Allergic/Immunologic: Negative for environmental allergies and food allergies.   Neurological:  Positive for headaches. Negative for weakness.   Hematological:  Negative for adenopathy. Does not bruise/bleed easily.   Psychiatric/Behavioral:  Negative for agitation and confusion.        Physical Exam     Initial Vitals [07/25/24 2058]   BP Pulse Resp Temp SpO2   135/88 107 17 98 °F (36.7 °C) 100 %      MAP       --         Physical Exam    Nursing note and vitals reviewed.  Constitutional: She appears well-developed. She appears distressed.   HENT:   Head: Normocephalic and atraumatic.   Eyes: EOM are normal. Pupils are equal, round, and reactive to light.   Neck: Neck supple.   Normal range of motion.  Cardiovascular:  Regular rhythm.   Tachycardia present.         Pulmonary/Chest: She has decreased breath sounds. She has no wheezes. She has no rhonchi.   Abdominal: She exhibits no distension. There is no abdominal tenderness.   Musculoskeletal:         General: No tenderness or edema.      Cervical back: Normal range of motion and neck supple.     Lymphadenopathy:     She has no cervical adenopathy.   Neurological: She is alert and oriented to person, place, and time. She displays normal reflexes. No cranial nerve deficit or sensory deficit.   Skin: Skin is warm and dry. Capillary refill takes less than 2 seconds.         Medical Screening Exam   See Full Note    ED Course   Procedures  Labs Reviewed   COMPREHENSIVE METABOLIC PANEL - Abnormal       Result Value    Sodium 139      Potassium 3.4 (*)     Chloride 107      CO2 28      Anion Gap 7      Glucose 133 (*)     BUN 12      Creatinine 1.10 (*)     BUN/Creatinine Ratio 11      Calcium 8.4 (*)     Total Protein 7.1      Albumin 3.4 (*)     Globulin 3.7      A/G Ratio 0.9       Bilirubin, Total 0.8      Alk Phos 44      ALT 19      AST 11 (*)     eGFR 63     TROPONIN I - Abnormal    Troponin I High Sensitivity 69.4 (*)    CBC WITH DIFFERENTIAL - Abnormal    WBC 6.65      RBC 4.62      Hemoglobin 10.7 (*)     Hematocrit 36.0 (*)     MCV 77.9 (*)     MCH 23.2 (*)     MCHC 29.7 (*)     RDW 18.0 (*)     Platelet Count 317      MPV 10.4      Neutrophils % 52.4 (*)     Lymphocytes % 34.4      Monocytes % 9.5 (*)     Eosinophils % 2.7      Basophils % 0.5      Immature Granulocytes % 0.5 (*)     nRBC, Auto 0.0      Neutrophils, Abs 3.49      Lymphocytes, Absolute 2.29      Monocytes, Absolute 0.63      Eosinophils, Absolute 0.18      Basophils, Absolute 0.03      Immature Granulocytes, Absolute 0.03      nRBC, Absolute 0.00      Diff Type Auto     TROPONIN I - Abnormal    Troponin I High Sensitivity 79.7 (*)    POCT GLUCOSE MONITORING CONTINUOUS - Abnormal    POC Glucose 137 (*)    POCT GLUCOSE MONITORING CONTINUOUS - Abnormal    POC Glucose 132 (*)    INFLUENZA A & B BY MOLECULAR - Normal    INFLUENZA A MOLECULAR Negative      INFLUENZA B MOLECULAR  Negative     NT-PRO NATRIURETIC PEPTIDE - Normal    ProBNP 15     SARS-COV-2 RNA AMPLIFICATION, QUAL - Normal    SARS COV-2 Molecular Negative      Narrative:     Negative SARS-CoV results should not be used as the sole basis for treatment or patient management decisions; negative results should be considered in the context of a patient's recent exposures, history and the presene of clinical signs and symptoms consistent with COVID-19.  Negative results should be treated as presumptive and confirmed by molecular assay, if necessary for patient management.   MAGNESIUM - Normal    Magnesium 1.9     CBC W/ AUTO DIFFERENTIAL    Narrative:     The following orders were created for panel order CBC auto differential.  Procedure                               Abnormality         Status                     ---------                                -----------         ------                     CBC with Differential[[id number]]       Abnormal            Final result                 Please view results for these tests on the individual orders.   POCT URINE PREGNANCY    POC Preg Test, Ur Negative       Acceptable Yes          ECG Results              EKG 12-lead (Final result)        Collection Time Result Time QRS Duration OHS QTC Calculation    07/25/24 20:57:19 07/26/24 17:24:00 84 432                     Final result by Interface, Lab In [facility] (07/26/24 17:24:05)                   Narrative:    Test Reason : R06.02,    Vent. Rate : 107 BPM     Atrial Rate : 000 BPM     P-R Int : 148 ms          QRS Dur : 084 ms      QT Int : 350 ms       P-R-T Axes : 069 073 040 degrees     QTc Int : 432 ms    Sinus tachycardia  ST junctional depression is nonspecific      Confirmed by Rosas HALE, Sahara SALGADO ([id number]) on 7/26/2024 5:23:59 PM    Referred By: [email or web address]   SELF           Confirmed By:Sahara Pillai MD                                  Imaging Results              X-Ray Chest AP (Final result)  Result time 07/26/24 07:54:28      Final result by Pepito Church DO (07/26/24 07:54:28)                   Impression:      No acute cardiopulmonary process demonstrated.    Point of Service: [facility] [place]      Electronically signed by: Pepito Church  Date:    07/26/2024  Time:    07:54               Narrative:    EXAMINATION:  XR CHEST AP PORTABLE    CLINICAL HISTORY:  sob;    COMPARISON:  Chest x-ray April 19, 2024    TECHNIQUE:  Frontal view/views of the chest.    FINDINGS:  Heart size appears within normal limits.  No focal consolidation, pleural effusion, or pneumothorax.  Visualized osseous and surrounding soft tissue structures demonstrate no acute abnormality.                                       Medications   furosemide injection 60 mg (60 mg Intravenous Given 7/25/24 3462)   morphine injection 2 mg (2 mg Intravenous Given 7/25/24  "2308)   acetaminophen tablet 1,000 mg (1,000 mg Oral Given 7/25/24 2313)   potassium chloride SA CR tablet 40 mEq (40 mEq Oral Given 7/26/24 8737)     Medical Decision Making  43 year old female presents to ED with complaint of chest pain, shortness of breath, and headache. Patient reports symptoms started this morning when she woke up. She states headache feels like a band wrapped around her head. Denies visual changes, nausea/vomiting. She reports she started experiencing chest pain to center of her chest and shortness of breath. She states with movement, her arms feel "heavy". Patient did not take morning medications due to not feeling well. Patient with PMH of CHF, DM, asthma, HLD, MVR, BJ, sleep apnea. Patient uses oxygen at home at 2L [place]      Labs, diagnostics obtained and reviewed. Case discussed with Dr. Acevedo. IV Lasix, PO Tylenol, IV Morphine administered.    Amount and/or Complexity of Data Reviewed  Labs: ordered.  Radiology: ordered.    Risk  OTC drugs.  Prescription drug management.              Attending Attestation:     Physician Attestation Statement for NP/PA:   I personally made/approved the management plan and take responsibility for the patient management.                                     Clinical Impression:   Final diagnoses:  [R06.02] SOB (shortness of breath)  [R79.89] Elevated troponin  [R79.89] Elevated troponin level not due myocardial infarction [R79.89] (Primary)  [G47.33] Obstructive sleep apnea syndrome [G47.33]  [E66.01, Z68.43] Morbid obesity with BMI of 50.0-59.9, adult [E66.01, Z68.43]  [J45.909] Asthma, unspecified asthma severity, unspecified whether complicated, unspecified whether persistent [J45.909]  [E11.69, E66.9] Diabetes mellitus type 2 in obese [E11.69, E66.9]  [I34.0] Mitral valve insufficiency, unspecified etiology [I34.0]        ED Disposition Condition    Observation                 Emmett Acevedo MD  07/27/24 0624    "

## 2024-07-29 ENCOUNTER — TELEPHONE (OUTPATIENT)
Dept: CARDIOLOGY | Facility: HOSPITAL | Age: 44
End: 2024-07-29
Payer: MEDICAID

## 2024-07-31 ENCOUNTER — TELEPHONE (OUTPATIENT)
Dept: CARDIOLOGY | Facility: HOSPITAL | Age: 44
End: 2024-07-31
Payer: MEDICAID

## 2024-08-06 ENCOUNTER — OFFICE VISIT (OUTPATIENT)
Dept: FAMILY MEDICINE | Facility: CLINIC | Age: 44
End: 2024-08-06
Payer: MEDICAID

## 2024-08-06 VITALS
WEIGHT: 293 LBS | OXYGEN SATURATION: 96 % | RESPIRATION RATE: 18 BRPM | TEMPERATURE: 98 F | HEIGHT: 71 IN | BODY MASS INDEX: 41.02 KG/M2 | SYSTOLIC BLOOD PRESSURE: 130 MMHG | HEART RATE: 74 BPM | DIASTOLIC BLOOD PRESSURE: 82 MMHG

## 2024-08-06 DIAGNOSIS — Z12.31 SCREENING MAMMOGRAM FOR BREAST CANCER: ICD-10-CM

## 2024-08-06 DIAGNOSIS — E11.69 DIABETES MELLITUS TYPE 2 IN OBESE: ICD-10-CM

## 2024-08-06 DIAGNOSIS — I10 ESSENTIAL (PRIMARY) HYPERTENSION: ICD-10-CM

## 2024-08-06 DIAGNOSIS — Z09 HOSPITAL DISCHARGE FOLLOW-UP: Primary | ICD-10-CM

## 2024-08-06 DIAGNOSIS — E66.01 MORBID OBESITY WITH BMI OF 50.0-59.9, ADULT: ICD-10-CM

## 2024-08-06 DIAGNOSIS — J45.909 ASTHMA, UNSPECIFIED ASTHMA SEVERITY, UNSPECIFIED WHETHER COMPLICATED, UNSPECIFIED WHETHER PERSISTENT: ICD-10-CM

## 2024-08-06 DIAGNOSIS — E66.9 DIABETES MELLITUS TYPE 2 IN OBESE: ICD-10-CM

## 2024-08-06 DIAGNOSIS — E78.2 MIXED HYPERLIPIDEMIA: ICD-10-CM

## 2024-08-06 PROCEDURE — 3044F HG A1C LEVEL LT 7.0%: CPT | Mod: CPTII,,, | Performed by: SPECIALIST

## 2024-08-06 PROCEDURE — 99213 OFFICE O/P EST LOW 20 MIN: CPT | Mod: GC,,, | Performed by: SPECIALIST

## 2024-08-06 PROCEDURE — 3075F SYST BP GE 130 - 139MM HG: CPT | Mod: CPTII,,, | Performed by: SPECIALIST

## 2024-08-06 PROCEDURE — 3079F DIAST BP 80-89 MM HG: CPT | Mod: CPTII,,, | Performed by: SPECIALIST

## 2024-08-06 PROCEDURE — 1159F MED LIST DOCD IN RCRD: CPT | Mod: CPTII,,, | Performed by: SPECIALIST

## 2024-08-06 PROCEDURE — 3008F BODY MASS INDEX DOCD: CPT | Mod: CPTII,,, | Performed by: SPECIALIST

## 2024-08-06 RX ORDER — LANCETS 28 GAUGE
1 EACH MISCELLANEOUS DAILY
Qty: 100 EACH | Refills: 3 | Status: SHIPPED | OUTPATIENT
Start: 2024-08-06

## 2024-08-12 ENCOUNTER — OFFICE VISIT (OUTPATIENT)
Dept: CARDIOLOGY | Facility: CLINIC | Age: 44
End: 2024-08-12
Payer: MEDICAID

## 2024-08-12 VITALS
DIASTOLIC BLOOD PRESSURE: 96 MMHG | BODY MASS INDEX: 41.02 KG/M2 | HEIGHT: 71 IN | WEIGHT: 293 LBS | HEART RATE: 84 BPM | SYSTOLIC BLOOD PRESSURE: 142 MMHG | OXYGEN SATURATION: 99 %

## 2024-08-12 DIAGNOSIS — R06.09 DYSPNEA ON EXERTION: Primary | ICD-10-CM

## 2024-08-12 DIAGNOSIS — I10 HYPERTENSION, UNSPECIFIED TYPE: ICD-10-CM

## 2024-08-12 DIAGNOSIS — R06.02 SHORTNESS OF BREATH: ICD-10-CM

## 2024-08-12 DIAGNOSIS — R07.89 ATYPICAL CHEST PAIN: ICD-10-CM

## 2024-08-12 DIAGNOSIS — R06.02 SHORTNESS OF BREATH: Primary | ICD-10-CM

## 2024-08-12 DIAGNOSIS — I10 ESSENTIAL (PRIMARY) HYPERTENSION: ICD-10-CM

## 2024-08-12 DIAGNOSIS — R06.02 SOB (SHORTNESS OF BREATH) ON EXERTION: ICD-10-CM

## 2024-08-12 PROCEDURE — 3080F DIAST BP >= 90 MM HG: CPT | Mod: CPTII,,, | Performed by: NURSE PRACTITIONER

## 2024-08-12 PROCEDURE — 99215 OFFICE O/P EST HI 40 MIN: CPT | Mod: PBBFAC,25 | Performed by: NURSE PRACTITIONER

## 2024-08-12 PROCEDURE — 99999 PR PBB SHADOW E&M-EST. PATIENT-LVL V: CPT | Mod: PBBFAC,,, | Performed by: NURSE PRACTITIONER

## 2024-08-12 PROCEDURE — 1159F MED LIST DOCD IN RCRD: CPT | Mod: CPTII,,, | Performed by: NURSE PRACTITIONER

## 2024-08-12 PROCEDURE — 3044F HG A1C LEVEL LT 7.0%: CPT | Mod: CPTII,,, | Performed by: NURSE PRACTITIONER

## 2024-08-12 PROCEDURE — 99214 OFFICE O/P EST MOD 30 MIN: CPT | Mod: S$PBB,,, | Performed by: NURSE PRACTITIONER

## 2024-08-12 PROCEDURE — 3008F BODY MASS INDEX DOCD: CPT | Mod: CPTII,,, | Performed by: NURSE PRACTITIONER

## 2024-08-12 PROCEDURE — 3077F SYST BP >= 140 MM HG: CPT | Mod: CPTII,,, | Performed by: NURSE PRACTITIONER

## 2024-08-12 PROCEDURE — 93005 ELECTROCARDIOGRAM TRACING: CPT | Mod: PBBFAC | Performed by: STUDENT IN AN ORGANIZED HEALTH CARE EDUCATION/TRAINING PROGRAM

## 2024-08-12 PROCEDURE — 93010 ELECTROCARDIOGRAM REPORT: CPT | Mod: S$PBB,,, | Performed by: STUDENT IN AN ORGANIZED HEALTH CARE EDUCATION/TRAINING PROGRAM

## 2024-08-12 RX ORDER — HYDRALAZINE HYDROCHLORIDE 50 MG/1
50 TABLET, FILM COATED ORAL 3 TIMES DAILY
Qty: 270 TABLET | Refills: 3 | Status: SHIPPED | OUTPATIENT
Start: 2024-08-12 | End: 2025-08-12

## 2024-08-12 NOTE — PROGRESS NOTES
PCP: Nette Linda MD    Referring Provider:     Subjective:   Padmini Gaspar is a 44 y.o. female with hx of sleep apnea, essential HTN, DM2 (A1c of 6.3), asthma, mixed HLD, moderate mitral regurgitation, and obesity. who presents for hospital discharge follow up for non-cardiac chest pain.     Pt was admitted 7/25/24 here at [facility] for c/o chest pain, SOB and headache. Chest pain was atypical. EKG showed sinus tachycardia with no ischemic changes. CXR was unremarkable. O2 sats were 100%. She had a LHC in 2021 that was normal. Echo 04/2024 showed EF of 65-70%, septal thickening, mild to moderate MR and dilated LA. Pt's complaints were more typical of GI symptoms. She was started on PPI and instructed to f/u with GI as OP.     Today, pt states she still has SOB with exertion. She states it has worsened over the last year. She has asthma and has been a heavy smoker until recently, she has not quit but has decreased to 3-4 cigs/day. She also states that she has gained 40-50 pounds over the last year. She has home O2 that she uses as needed. Her BP is slightly elevated today at 140s/90s.     Fhx: Mother: heart disease and DM2  Shx: current smoker (has recently decreased to 3-4 cigs/day), no etoh or drug use    EKG   Results for orders placed or performed during the hospital encounter of 07/25/24   EKG 12-lead    Collection Time: 07/25/24  8:57 PM   Result Value Ref Range    QRS Duration 84 ms    OHS QTC Calculation 432 ms    Narrative    Test Reason : R06.02,    Vent. Rate : 107 BPM     Atrial Rate : 000 BPM     P-R Int : 148 ms          QRS Dur : 084 ms      QT Int : 350 ms       P-R-T Axes : 069 073 040 degrees     QTc Int : 432 ms    Sinus tachycardia  ST junctional depression is nonspecific      Confirmed by Sahara Pillai MD ([id number]) on 7/26/2024 5:23:59 PM    Referred By: [email or web address]   SELF           Confirmed By:Sahara Pillai MD     ECHO Results for orders placed during the hospital  encounter of 04/19/24    Echo    Interpretation Summary    Left Ventricle: The left ventricle is normal in size. Mildly increased wall thickness. Septal thickening. There is normal systolic function with a visually estimated ejection fraction of 65 - 70%. There is normal diastolic function.    Right Ventricle: Normal right ventricular cavity size. Systolic function is normal.    Left Atrium: Left atrium is dilated.    Aortic Valve: The aortic valve is a trileaflet valve.    Mitral Valve: There is mild to moderate regurgitation.    [facility] Results for orders placed during the hospital encounter of 07/11/21    Cardiac catheterization    Conclusion  · The estimated blood loss was none.  · The coronary arteries were normal..    The procedure log was documented by Documenter: RT Cody and verified by Isaías Darden MD.    Date: 7/11/2021  Time: 11:22 AM        Lab Results   Component Value Date     07/25/2024    K 3.4 (L) 07/25/2024     07/25/2024    CO2 28 07/25/2024    BUN 12 07/25/2024    CREATININE 1.10 (H) 07/25/2024    CALCIUM 8.4 (L) 07/25/2024    ANIONGAP 7 07/25/2024    ESTGFRAFRICA 80 07/20/2021       Lab Results   Component Value Date    CHOL 135 04/20/2024     Lab Results   Component Value Date    HDL 36 (L) 04/20/2024     Lab Results   Component Value Date    LDLCALC 79 04/20/2024     Lab Results   Component Value Date    TRIG 101 04/20/2024     Lab Results   Component Value Date    CHOLHDL 3.8 04/20/2024       Lab Results   Component Value Date    WBC 6.65 07/25/2024    HGB 10.7 (L) 07/25/2024    HCT 36.0 (L) 07/25/2024    MCV 77.9 (L) 07/25/2024     07/25/2024           Current Outpatient Medications:     albuterol (PROVENTIL) 2.5 mg /3 mL (0.083 %) nebulizer solution, Take 2.5 mg by nebulization every 4 (four) hours as needed., Disp: , Rfl:     albuterol (PROVENTIL/VENTOLIN HFA) 90 mcg/actuation inhaler, Inhale 2 puffs into the lungs every 4 (four) hours as needed., Disp: , Rfl:      amLODIPine (NORVASC) 10 MG tablet, Take 10 mg by mouth once daily., Disp: , Rfl:     aspirin (ECOTRIN) 81 MG EC tablet, Take 1 tablet (81 mg total) by mouth once daily., Disp: 90 tablet, Rfl: 1    atorvastatin (LIPITOR) 40 MG tablet, Take 1 tablet (40 mg total) by mouth once daily., Disp: 90 tablet, Rfl: 1    blood sugar diagnostic (TRUE METRIX GLUCOSE TEST STRIP) Strp, 1 each by skin prick route once daily., Disp: 50 each, Rfl: 6    blood-glucose meter (TRUE METRIX AIR GLUCOSE METER MISC), , Disp: , Rfl:     carvediloL (COREG) 25 MG tablet, Take 25 mg by mouth 2 (two) times daily., Disp: , Rfl:     dapagliflozin propanediol (FARXIGA) 10 mg tablet, Take 1 tablet by mouth once daily., Disp: , Rfl:     ferrous fumarate-folic acid 324 mg, 106mg iron,-1mg (HEMOCYTE-F) Tab, Take 1 tablet by mouth 2 (two) times daily with meals., Disp: , Rfl:     fluticasone furoate-vilanteroL (BREO) 100-25 mcg/dose diskus inhaler, Inhale 1 puff into the lungs once daily., Disp: , Rfl:     furosemide (LASIX) 40 MG tablet, Take 40 mg by mouth every morning., Disp: , Rfl:     HYDROcodone-acetaminophen (NORCO) 7.5-325 mg per tablet, Take 1 tablet by mouth daily as needed., Disp: , Rfl:     ipratropium (ATROVENT) 21 mcg (0.03 %) nasal spray, 2 sprays by Each Nostril route 2 (two) times daily as needed for Rhinitis., Disp: 30 mL, Rfl: 0    lancets (TRUEPLUS LANCETS) 28 gauge Misc, 1 lancet  by skin prick route once daily., Disp: 100 each, Rfl: 3    metFORMIN (GLUCOPHAGE) 500 MG tablet, Take 500 mg by mouth 2 (two) times daily with meals., Disp: , Rfl:     olmesartan-hydrochlorothiazide (BENICAR HCT) 40-25 mg per tablet, Take 1 tablet by mouth once daily., Disp: , Rfl:     rosuvastatin (CRESTOR) 10 MG tablet, Take 10 mg by mouth every evening., Disp: , Rfl:     hydrALAZINE (APRESOLINE) 50 MG tablet, Take 1 tablet (50 mg total) by mouth 3 (three) times daily., Disp: 270 tablet, Rfl: 3    Review of Systems   Constitutional:  Negative for  "chills, diaphoresis, fever and malaise/fatigue.   Respiratory:  Negative for cough and shortness of breath.    Cardiovascular:  Negative for chest pain, palpitations, orthopnea, leg swelling and PND.        SOB on exertion   Gastrointestinal:  Negative for abdominal pain, nausea and vomiting.   Musculoskeletal:  Negative for falls.   Neurological:  Negative for focal weakness and weakness.         Objective:   BP (!) 142/96 (BP Location: Left arm, Patient Position: Sitting)   Pulse 84   Ht 5' 11" (1.803 m)   Wt (!) 179.2 kg (395 lb)   SpO2 99%   BMI 55.09 kg/m²     Physical Exam  Constitutional:       General: She is not in acute distress.     Appearance: Normal appearance. She is obese.   Neck:      Vascular: No JVD.   Cardiovascular:      Rate and Rhythm: Normal rate and regular rhythm.   Pulmonary:      Effort: Pulmonary effort is normal.      Breath sounds: Decreased air movement present.   Musculoskeletal:      Cervical back: Neck supple. No rigidity.      Comments: Trace edema to BLE   Skin:     General: Skin is warm and dry.   Neurological:      Mental Status: She is alert.           Assessment:     1. Dyspnea on exertion  Pulmonary function test    Ambulatory referral/consult to Pulmonology      2. Shortness of breath  EKG 12-lead      3. Hypertension, unspecified type  EKG 12-lead      4. Essential (primary) hypertension        5. Atypical chest pain        6. SOB (shortness of breath) on exertion              Plan:   Essential (primary) hypertension  - BP elevated at 140s/90s today  - increase hydralazine from 25mg to 50mg tid  - continue norvasc 10mg daily, coreg 25mg bid, olmesartan/HCTZ 40-25mg daily    Atypical chest pain  - [facility] in 2021 showed normal coronaries  - EKG with on evidence of ischemia  - suspect GI in nature, however she is no longer taking her PPI  - has appointment with GI later this month      SOB (shortness of breath) on exertion  - Echo shows normal LVEF 65-70%, septal thickening, " mild to moderate MR, dilated LA  - SOB on exertion is multifactorial: obese with significant weight gain over the last year (40-50 pounds), deconditioning, asthma on O2 prn  - decreased breath sounds on exam  - PFTs ordered and referred to pulmonary      Follow up with me in 6 months

## 2024-08-12 NOTE — PATIENT INSTRUCTIONS
Pulmonary function test as outpatient  Referral sent to pulmonary  Increase hydralazine from 25mg to 50mg three times a day  Follow up with me in 6 months

## 2024-08-13 PROBLEM — R06.02 SOB (SHORTNESS OF BREATH) ON EXERTION: Status: ACTIVE | Noted: 2024-08-13

## 2024-08-13 LAB
OHS QRS DURATION: 90 MS
OHS QTC CALCULATION: 469 MS

## 2024-08-13 NOTE — ASSESSMENT & PLAN NOTE
- Echo shows normal LVEF 65-70%, septal thickening, mild to moderate MR, dilated LA  - SOB on exertion is multifactorial: obese with significant weight gain over the last year (40-50 pounds), deconditioning, asthma on O2 prn  - decreased breath sounds on exam  - PFTs ordered and referred to pulmonary

## 2024-08-13 NOTE — ASSESSMENT & PLAN NOTE
- LHC in 2021 showed normal coronaries  - EKG with on evidence of ischemia  - suspect GI in nature, however she is no longer taking her PPI  - has appointment with GI later this month

## 2024-08-13 NOTE — ASSESSMENT & PLAN NOTE
- BP elevated at 140s/90s today  - increase hydralazine from 25mg to 50mg tid  - continue norvasc 10mg daily, coreg 25mg bid, olmesartan/HCTZ 40-25mg daily

## 2024-08-26 ENCOUNTER — CLINICAL SUPPORT (OUTPATIENT)
Dept: PULMONOLOGY | Facility: HOSPITAL | Age: 44
End: 2024-08-26
Payer: MEDICAID

## 2024-08-26 VITALS — OXYGEN SATURATION: 98 %

## 2024-08-26 DIAGNOSIS — R06.09 DYSPNEA ON EXERTION: ICD-10-CM

## 2024-08-26 PROCEDURE — 94727 GAS DIL/WSHOT DETER LNG VOL: CPT

## 2024-08-26 PROCEDURE — 94729 DIFFUSING CAPACITY: CPT

## 2024-08-26 PROCEDURE — 27100098 HC SPACER

## 2024-08-26 PROCEDURE — 94060 EVALUATION OF WHEEZING: CPT

## 2024-08-29 ENCOUNTER — OFFICE VISIT (OUTPATIENT)
Dept: GASTROENTEROLOGY | Facility: CLINIC | Age: 44
End: 2024-08-29
Payer: MEDICAID

## 2024-08-29 VITALS
OXYGEN SATURATION: 97 % | HEIGHT: 71 IN | BODY MASS INDEX: 41.02 KG/M2 | HEART RATE: 94 BPM | WEIGHT: 293 LBS | DIASTOLIC BLOOD PRESSURE: 105 MMHG | SYSTOLIC BLOOD PRESSURE: 183 MMHG

## 2024-08-29 DIAGNOSIS — D64.9 ANEMIA, UNSPECIFIED TYPE: ICD-10-CM

## 2024-08-29 DIAGNOSIS — R07.89 ATYPICAL CHEST PAIN: Primary | ICD-10-CM

## 2024-08-29 DIAGNOSIS — E66.01 MORBID OBESITY WITH BMI OF 50.0-59.9, ADULT: ICD-10-CM

## 2024-08-29 PROCEDURE — 99215 OFFICE O/P EST HI 40 MIN: CPT | Mod: PBBFAC | Performed by: NURSE PRACTITIONER

## 2024-08-29 PROCEDURE — 99999 PR PBB SHADOW E&M-EST. PATIENT-LVL V: CPT | Mod: PBBFAC,,, | Performed by: NURSE PRACTITIONER

## 2024-08-29 RX ORDER — OMEPRAZOLE 40 MG/1
40 CAPSULE, DELAYED RELEASE ORAL EVERY MORNING
Qty: 30 CAPSULE | Refills: 2 | Status: SHIPPED | OUTPATIENT
Start: 2024-08-29 | End: 2024-11-27

## 2024-08-29 NOTE — PATIENT INSTRUCTIONS
Avoid spicy, greasy foods  Avoid caffeine, citric acid, chocolate, peppermint, and carbonated drinks  Do not lay down within 3 hours of eating  Increase fluid to 64 ounces daily  Avoid antiinflammatory medications such as motrin, advil, aleve, ibuprofen, and BC powder  Omeprazole 40 mg daily

## 2024-08-29 NOTE — PROGRESS NOTES
Padmini Gaspar is a 44 y.o. female here for No chief complaint on file.        PCP: Nette Linda  Referring Provider: Veronica Plata Fnp  [address] [place] [address] -   [place] [id number]     HPI:  Presents in referral after hospitalization for atypical chest pain.  Review of hospital record, discharged from the hospital on 07/26/2024. Per chart review she underwent extensive cardiac evaluation, including LHC in 2021, which demonstrated normal coronary arteries, and normal LV systolic function. Last echo in 4/2024 showed an EF of 65-70%.  At the time of discharge the patient's chest pain was felt to be atypical and recommendation for GI evaluation with EGD was made.  She is not currently taking a PPI.  Review of labs from 07/25/2024 reveals HGB of 10.7 and HCT of 36.  She is currently taking an oral iron supplement.  Reports that she has menorrhagia which results in chronic iron-deficiency.  She has moved here from [place] and has not established yet with gyn.  She is currently being followed by [facility], Ginny Castillo, NP.  Is not taking NSAIDs.  Denies dysphagia.  Denies heartburn or typical reflux symptoms.  She reports that the chest pain increases with movement and also at night when lying flat in bed.  No previous cholecystectomy.  She is diabetic.  She has sleep apnea.  States that she has not used CPAP machine in several days due to a broken hose.  Blood pressure is elevated in the office.  She has been advised to follow up with primary care.  States that she has recently changed multiple blood pressure medications.  Risks and benefits of EGD and plan of care discussed with the patient.  Patient agrees to schedule.          ROS:  Review of Systems   Constitutional:  Negative for appetite change (Reports that she eats very little), fatigue, fever and unexpected weight change.   HENT:  Negative for trouble swallowing.    Respiratory:  Negative for shortness of  breath.    Cardiovascular:  Positive for chest pain. Negative for palpitations.   Gastrointestinal:  Negative for abdominal pain, blood in stool, change in bowel habit, constipation, diarrhea, nausea, vomiting and reflux.   Musculoskeletal:  Negative for gait problem.   Integumentary:  Negative for pallor.   Hematological:  Does not bruise/bleed easily.   Psychiatric/Behavioral:  The patient is not nervous/anxious.           PMHX:  has a past medical history of Asthma, Diabetes mellitus type 2 in obese, Essential (primary) hypertension, Mixed hyperlipidemia, Moderate mitral regurgitation by prior echocardiogram, Morbid obesity with BMI of 50.0-59.9, adult, and Sleep apnea.    PSHX:  has a past surgical history that includes  section and Left heart catheterization (Right, 2021).    PFHX: family history includes Diabetes in her father and mother; Heart disease in her mother.    PSlHX:  reports that she has been smoking. She has never used smokeless tobacco. She reports that she does not currently use alcohol. She reports that she does not currently use drugs.        Review of patient's allergies indicates:  No Known Allergies    Medication List with Changes/Refills   New Medications    OMEPRAZOLE (PRILOSEC) 40 MG CAPSULE    Take 1 capsule (40 mg total) by mouth every morning.   Current Medications    ALBUTEROL (PROVENTIL) 2.5 MG /3 ML (0.083 %) NEBULIZER SOLUTION    Take 2.5 mg by nebulization every 4 (four) hours as needed.    ALBUTEROL (PROVENTIL/VENTOLIN HFA) 90 MCG/ACTUATION INHALER    Inhale 2 puffs into the lungs every 4 (four) hours as needed.    AMLODIPINE (NORVASC) 10 MG TABLET    Take 10 mg by mouth once daily.    ASPIRIN (ECOTRIN) 81 MG EC TABLET    Take 1 tablet (81 mg total) by mouth once daily.    ATORVASTATIN (LIPITOR) 40 MG TABLET    Take 1 tablet (40 mg total) by mouth once daily.    BLOOD SUGAR DIAGNOSTIC (TRUE METRIX GLUCOSE TEST STRIP) STRP    1 each by skin prick route once daily.     "BLOOD-GLUCOSE METER (TRUE METRIX AIR GLUCOSE METER MISC)        CARVEDILOL (COREG) 25 MG TABLET    Take 25 mg by mouth 2 (two) times daily.    DAPAGLIFLOZIN PROPANEDIOL (FARXIGA) 10 MG TABLET    Take 1 tablet by mouth once daily.    FERROUS FUMARATE-FOLIC ACID 324 MG, 106MG IRON,-1MG (HEMOCYTE-F) TAB    Take 1 tablet by mouth 2 (two) times daily with meals.    FLUTICASONE FUROATE-VILANTEROL (BREO) 100-25 MCG/DOSE DISKUS INHALER    Inhale 1 puff into the lungs once daily.    FUROSEMIDE (LASIX) 40 MG TABLET    Take 40 mg by mouth every morning.    HYDRALAZINE (APRESOLINE) 50 MG TABLET    Take 1 tablet (50 mg total) by mouth 3 (three) times daily.    HYDROCODONE-ACETAMINOPHEN (NORCO) 7.5-325 MG PER TABLET    Take 1 tablet by mouth daily as needed.    IPRATROPIUM (ATROVENT) 21 MCG (0.03 %) NASAL SPRAY    2 sprays by Each Nostril route 2 (two) times daily as needed for Rhinitis.    LANCETS (TRUEPLUS LANCETS) 28 GAUGE MISC    1 lancet  by skin prick route once daily.    METFORMIN (GLUCOPHAGE) 500 MG TABLET    Take 500 mg by mouth 2 (two) times daily with meals.    OLMESARTAN-HYDROCHLOROTHIAZIDE (BENICAR HCT) 40-25 MG PER TABLET    Take 1 tablet by mouth once daily.    ROSUVASTATIN (CRESTOR) 10 MG TABLET    Take 10 mg by mouth every evening.        Objective Findings:  Vital Signs:  BP (!) 183/105   Pulse 94   Ht 5' 11" (1.803 m)   Wt (!) 179 kg (394 lb 9.6 oz)   SpO2 97%   BMI 55.04 kg/m²  Body mass index is 55.04 kg/m².    Physical Exam:  Physical Exam  Vitals and nursing note reviewed.   Constitutional:       General: She is not in acute distress.     Appearance: Normal appearance. She is morbidly obese.   HENT:      Mouth/Throat:      Mouth: Mucous membranes are moist.   Cardiovascular:      Rate and Rhythm: Normal rate.   Pulmonary:      Effort: Pulmonary effort is normal.      Breath sounds: No wheezing, rhonchi or rales.   Abdominal:      General: Bowel sounds are normal. There is no distension.      " Palpations: Abdomen is soft. There is no mass.      Tenderness: There is no abdominal tenderness.      Hernia: No hernia is present.   Musculoskeletal:      Right lower leg: No edema.      Left lower leg: No edema.   Skin:     General: Skin is warm and dry.      Coloration: Skin is not jaundiced or pale.   Neurological:      Mental Status: She is alert and oriented to person, place, and time.   Psychiatric:         Mood and Affect: Mood normal.          Labs:  Lab Results   Component Value Date    WBC 6.65 07/25/2024    HGB 10.7 (L) 07/25/2024    HCT 36.0 (L) 07/25/2024    MCV 77.9 (L) 07/25/2024    RDW 18.0 (H) 07/25/2024     07/25/2024    LYMPH 34.4 07/25/2024    LYMPH 2.29 07/25/2024    MONO 9.5 (H) 07/25/2024    EOS 0.18 07/25/2024    BASO 0.03 07/25/2024     Lab Results   Component Value Date     07/25/2024    K 3.4 (L) 07/25/2024     07/25/2024    CO2 28 07/25/2024     (H) 07/25/2024    BUN 12 07/25/2024    CREATININE 1.10 (H) 07/25/2024    CALCIUM 8.4 (L) 07/25/2024    PROT 7.1 07/25/2024    ALBUMIN 3.4 (L) 07/25/2024    BILITOT 0.8 07/25/2024    ALKPHOS 44 07/25/2024    AST 11 (L) 07/25/2024    ALT 19 07/25/2024         Imaging: No results found.      Assessment:  Padmini aGspar is a 44 y.o. female here with:  1. Atypical chest pain    2. Anemia, unspecified type    3. Morbid obesity with BMI of 50.0-59.9, adult          Recommendations:  1. Abdominal ultrasound on and EGD  2. Iron studies, CBC, lipase, CMP  3. Omeprazole 40 mg daily  4. Avoid spicy, greasy foods  Avoid caffeine, citric acid, chocolate, peppermint, and carbonated drinks  Do not lay down within 3 hours of eating  Exercise 150 minutes per week  Increase fluid to 64 ounces daily  Avoid antiinflammatory medications such as motrin, advil, aleve, ibuprofen, and BC powder      Follow up in about 8 weeks (around 10/24/2024).      Order summary:  Orders Placed This Encounter    US Abdomen Complete    Iron and TIBC     Ferritin    CBC Auto Differential    Lipase    Comprehensive Metabolic Panel    omeprazole (PRILOSEC) 40 MG capsule    EGD       Thank you for allowing me to participate in the care of Padmini Gaspar.      KATARINA DuboseC

## 2024-09-12 ENCOUNTER — TELEPHONE (OUTPATIENT)
Dept: GASTROENTEROLOGY | Facility: CLINIC | Age: 44
End: 2024-09-12
Payer: MEDICAID

## 2024-09-12 ENCOUNTER — HOSPITAL ENCOUNTER (OUTPATIENT)
Dept: RADIOLOGY | Facility: HOSPITAL | Age: 44
Discharge: HOME | End: 2024-09-12
Attending: NURSE PRACTITIONER
Payer: MEDICAID

## 2024-09-12 DIAGNOSIS — R07.89 ATYPICAL CHEST PAIN: ICD-10-CM

## 2024-09-12 PROCEDURE — 76700 US EXAM ABDOM COMPLETE: CPT | Mod: 26,,, | Performed by: STUDENT IN AN ORGANIZED HEALTH CARE EDUCATION/TRAINING PROGRAM

## 2024-09-12 PROCEDURE — 76700 US EXAM ABDOM COMPLETE: CPT | Mod: TC

## 2024-09-12 NOTE — TELEPHONE ENCOUNTER
Attempted to call results. Left message to call back or check portal for results.              ----- Message from ZAHEER Hamilton sent at 9/12/2024 10:48 AM CDT -----  Hepatomegaly. Image quality decreased due to body habitus. No gallstones or wall thickening.

## 2024-09-12 NOTE — TELEPHONE ENCOUNTER
Attempted to call results. Left message.            ----- Message from ZAHEER Hamilton sent at 9/12/2024 12:11 PM CDT -----  Iron-deficiency.  Continue oral iron.  She should already be scheduled for EGD.  Offer patient colonoscopy due to iron-deficiency anemia.  Also recommend that she establish with gyn.

## 2024-09-13 ENCOUNTER — PATIENT MESSAGE (OUTPATIENT)
Dept: GASTROENTEROLOGY | Facility: CLINIC | Age: 44
End: 2024-09-13
Payer: MEDICAID

## 2024-09-13 ENCOUNTER — TELEPHONE (OUTPATIENT)
Dept: GASTROENTEROLOGY | Facility: CLINIC | Age: 44
End: 2024-09-13
Payer: MEDICAID

## 2024-09-13 DIAGNOSIS — D50.9 IRON DEFICIENCY ANEMIA, UNSPECIFIED IRON DEFICIENCY ANEMIA TYPE: Primary | ICD-10-CM

## 2024-09-13 NOTE — TELEPHONE ENCOUNTER
Results and recommendations called to patient. Verbalized understanding. Agreeable to colonoscopy. Patient was not able to stay on the line for me to schedule. States she would like for me to schedule and call back later with appointment. Will also mail prep instruction sheet to patient.              ----- Message from ZAHEER Hamilton sent at 9/12/2024 12:11 PM CDT -----  Iron-deficiency.  Continue oral iron.  She should already be scheduled for EGD.  Offer patient colonoscopy due to iron-deficiency anemia.  Also recommend that she establish with gyn.

## 2024-10-01 ENCOUNTER — ANESTHESIA (OUTPATIENT)
Dept: GASTROENTEROLOGY | Facility: HOSPITAL | Age: 44
End: 2024-10-01
Payer: MEDICAID

## 2024-10-01 ENCOUNTER — HOSPITAL ENCOUNTER (OUTPATIENT)
Dept: GASTROENTEROLOGY | Facility: HOSPITAL | Age: 44
Discharge: HOME | End: 2024-10-01
Attending: NURSE PRACTITIONER
Payer: MEDICAID

## 2024-10-01 ENCOUNTER — ANESTHESIA EVENT (OUTPATIENT)
Dept: GASTROENTEROLOGY | Facility: HOSPITAL | Age: 44
End: 2024-10-01
Payer: MEDICAID

## 2024-10-01 VITALS
SYSTOLIC BLOOD PRESSURE: 167 MMHG | HEART RATE: 77 BPM | DIASTOLIC BLOOD PRESSURE: 98 MMHG | BODY MASS INDEX: 41.02 KG/M2 | RESPIRATION RATE: 12 BRPM | WEIGHT: 293 LBS | OXYGEN SATURATION: 98 % | HEIGHT: 71 IN

## 2024-10-01 DIAGNOSIS — K44.9 HH (HIATUS HERNIA): ICD-10-CM

## 2024-10-01 DIAGNOSIS — K29.60 EROSIVE GASTRITIS: ICD-10-CM

## 2024-10-01 DIAGNOSIS — A04.8 H. PYLORI INFECTION: ICD-10-CM

## 2024-10-01 DIAGNOSIS — D64.9 ANEMIA, UNSPECIFIED TYPE: ICD-10-CM

## 2024-10-01 DIAGNOSIS — R13.19 ESOPHAGEAL DYSPHAGIA: Primary | ICD-10-CM

## 2024-10-01 DIAGNOSIS — D50.0 IRON DEFICIENCY ANEMIA DUE TO CHRONIC BLOOD LOSS: ICD-10-CM

## 2024-10-01 DIAGNOSIS — R07.89 ATYPICAL CHEST PAIN: ICD-10-CM

## 2024-10-01 LAB — GLUCOSE SERPL-MCNC: 116 MG/DL (ref 70–105)

## 2024-10-01 PROCEDURE — 27201423 OPTIME MED/SURG SUP & DEVICES STERILE SUPPLY

## 2024-10-01 PROCEDURE — 27000284 HC CANNULA NASAL: Performed by: NURSE ANESTHETIST, CERTIFIED REGISTERED

## 2024-10-01 PROCEDURE — 43239 EGD BIOPSY SINGLE/MULTIPLE: CPT | Mod: ,,, | Performed by: INTERNAL MEDICINE

## 2024-10-01 PROCEDURE — 43450 DILATE ESOPHAGUS 1/MULT PASS: CPT | Mod: 51,,, | Performed by: INTERNAL MEDICINE

## 2024-10-01 PROCEDURE — 43239 EGD BIOPSY SINGLE/MULTIPLE: CPT | Performed by: INTERNAL MEDICINE

## 2024-10-01 PROCEDURE — 25000003 PHARM REV CODE 250: Performed by: NURSE ANESTHETIST, CERTIFIED REGISTERED

## 2024-10-01 PROCEDURE — 37000009 HC ANESTHESIA EA ADD 15 MINS

## 2024-10-01 PROCEDURE — 43450 DILATE ESOPHAGUS 1/MULT PASS: CPT | Performed by: INTERNAL MEDICINE

## 2024-10-01 PROCEDURE — 63600175 PHARM REV CODE 636 W HCPCS: Performed by: NURSE ANESTHETIST, CERTIFIED REGISTERED

## 2024-10-01 PROCEDURE — 37000008 HC ANESTHESIA 1ST 15 MINUTES

## 2024-10-01 PROCEDURE — 88305 TISSUE EXAM BY PATHOLOGIST: CPT | Mod: TC,91,SUR | Performed by: INTERNAL MEDICINE

## 2024-10-01 RX ORDER — PROPOFOL 10 MG/ML
VIAL (ML) INTRAVENOUS
Status: DISCONTINUED | OUTPATIENT
Start: 2024-10-01 | End: 2024-10-01

## 2024-10-01 RX ORDER — FENTANYL CITRATE 50 UG/ML
INJECTION, SOLUTION INTRAMUSCULAR; INTRAVENOUS
Status: DISCONTINUED | OUTPATIENT
Start: 2024-10-01 | End: 2024-10-01

## 2024-10-01 RX ORDER — LIDOCAINE HYDROCHLORIDE 20 MG/ML
INJECTION, SOLUTION EPIDURAL; INFILTRATION; INTRACAUDAL; PERINEURAL
Status: DISCONTINUED | OUTPATIENT
Start: 2024-10-01 | End: 2024-10-01

## 2024-10-01 RX ADMIN — FENTANYL CITRATE 50 MCG: 50 INJECTION, SOLUTION INTRAMUSCULAR; INTRAVENOUS at 01:10

## 2024-10-01 RX ADMIN — LIDOCAINE HYDROCHLORIDE 100 MG: 20 INJECTION, SOLUTION INTRAVENOUS at 01:10

## 2024-10-01 RX ADMIN — PROPOFOL 50 MG: 10 INJECTION, EMULSION INTRAVENOUS at 01:10

## 2024-10-01 RX ADMIN — SODIUM CHLORIDE: 9 INJECTION, SOLUTION INTRAVENOUS at 01:10

## 2024-10-01 NOTE — DISCHARGE INSTRUCTIONS
Procedure Date  10/1/24     Impression  Overall Impression:   Performed forceps biopsies in the stomach  Performed forceps biopsies in the esophagus  Dilated with George dilator  Abnormal mucosa with erosion in the antrum; performed cold forceps biopsy  Small hiatal hernia  Mucosa of the esophagus was grossly normal with GE junction at 45 cm.  The distal esophagus with biopsied and the esophagus was dilated with 48 [place] George dilator.  A small hiatal hernia was noted.  The stomach had erosive gastritis of the antrum and biopsies were obtained.  The pyloric channel was patent.  Because of the duodenal bulb 2nd 3rd portions were normal-appearing.  Impression:  Chest pain, esophageal dysphagia, status post dilation of the esophagus, erosive gastritis.  Recommendation  Await pathology results, due: 10/3/2024      Disposition: Discharged to home in stable condition,     resume diet, resume PPI, repeat esophageal dilation p.r.n.,     keep appointment for colonoscopy for iron-deficiency anemia. - NOVEMBER 21, 2024 @ 11:30AM    No driving today, no operating heavy machinery, no signing any legal documents until tomorrow.    Drink lots of fluids, resume regular diet.  Take your normal medications.     Please call our office for any nausea, vomiting or abdominal pain.

## 2024-10-01 NOTE — H&P
[facility] End[facility]  Gastroenterology  H&P    Patient Name: Padmini Gaspar  MRN: [id number]  Admission Date: 10/1/2024  Code Status: Prior    Attending Provider: Veronica Plata FNP   Primary Care Physician: Nette Linda MD  Principal Problem:<principal problem not specified>    Subjective:     History of Present Illness:  This patient is a 44-year-old female who presents for EGD.  She has atypical chest pain morbid obesity and diabetes mellitus.  She has been found to have iron-deficiency anemia.  Past Medical History:   Diagnosis Date    Asthma     Diabetes mellitus type 2 in obese     Essential (primary) hypertension     Mixed hyperlipidemia     Moderate mitral regurgitation by prior echocardiogram     Morbid obesity with BMI of 50.0-59.9, adult     Sleep apnea        Past Surgical History:   Procedure Laterality Date     SECTION      LEFT HEART CATHETERIZATION Right 2021    Procedure: Left heart cath;  Surgeon: Isaías Darden MD;  Location: [facility] CATH LAB;  Service: Cardiology;  Laterality: Right;    TUBAL LIGATION         Review of patient's allergies indicates:  No Known Allergies  Family History       Problem Relation (Age of Onset)    Diabetes Mother, Father    Heart disease Mother          Tobacco Use    Smoking status: Light Smoker     Current packs/day: 0.25     Types: Cigarettes    Smokeless tobacco: Never   Substance and Sexual Activity    Alcohol use: Not Currently    Drug use: Not Currently    Sexual activity: Yes     Partners: Male     Review of Systems   Respiratory:  Negative for shortness of breath.    Cardiovascular:  Positive for chest pain.   Gastrointestinal: Negative.      Objective:     Vital Signs (Most Recent):  Pulse: 84 (10/01/24 1155)  Resp: 10 (10/01/24 1155)  BP: (!) 165/107 (10/01/24 1155)  SpO2: 98 % (10/01/24 1155) Vital Signs (24h Range):  Pulse:  [84] 84  Resp:  [10] 10  SpO2:  [98 %] 98 %  BP: (165)/(107) 165/107     Weight: (!) 175.1 kg (386 lb)  "(10/01/24 1155)  Body mass index is 53.84 kg/m².    No intake or output data in the 24 hours ending 10/01/24 1336    Lines/Drains/Airways       Peripheral Intravenous Line  Duration                  Peripheral IV - Single Lumen 10/01/24 1201 22 G Anterior;Left Forearm <1 day                    Physical Exam  Vitals reviewed.   Constitutional:       General: She is not in acute distress.     Appearance: Normal appearance. She is well-developed. She is obese. She is not ill-appearing.   HENT:      Head: Normocephalic and atraumatic.      Nose: Nose normal.   Eyes:      Pupils: Pupils are equal, round, and reactive to light.   Cardiovascular:      Rate and Rhythm: Normal rate and regular rhythm.   Pulmonary:      Effort: Pulmonary effort is normal.      Breath sounds: Normal breath sounds. No wheezing.   Abdominal:      General: Abdomen is flat. Bowel sounds are normal. There is no distension.      Palpations: Abdomen is soft.      Tenderness: There is no abdominal tenderness. There is no guarding.   Skin:     General: Skin is warm and dry.      Coloration: Skin is not jaundiced.   Neurological:      Mental Status: She is alert.   Psychiatric:         Attention and Perception: Attention normal.         Mood and Affect: Affect normal.         Speech: Speech normal.         Behavior: Behavior is cooperative.      Comments: Pt was calm while speaking.         Significant Labs:  CBC: No results for input(s): "WBC", "HGB", "HCT", "PLT" in the last 48 hours.  CMP: No results for input(s): "GLU", "CALCIUM", "ALBUMIN", "PROT", "NA", "K", "CO2", "CL", "BUN", "CREATININE", "ALKPHOS", "ALT", "AST", "BILITOT" in the last 48 hours.    Significant Imaging:  Imaging results within the past 24 hours have been reviewed.    Assessment/Plan:     There are no hospital problems to display for this patient.        Imp: chest pain, iron deficiency anemia  Plan: egd    Ascencion Camacho MD  Gastroenterology  [facility] - Endoscopy  "

## 2024-10-01 NOTE — TRANSFER OF CARE
"Anesthesia Transfer of Care Note    Patient: Padmini Gaspar    Procedure(s) Performed: EGD    Patient location: GI    Anesthesia Type: general    Transport from OR: Transported from OR on room air with adequate spontaneous ventilation. Continuous ECG monitoring in transport. Continuous SpO2 monitoring in transport    Post pain: adequate analgesia    Post assessment: no apparent anesthetic complications and tolerated procedure well    Post vital signs: stable    Level of consciousness: responds to stimulation and sedated    Nausea/Vomiting: no nausea/vomiting    Complications: none    Transfer of care protocol was followed      Last vitals: Visit Vitals  BP (!) 165/107   Pulse 84   Resp 10   Ht 5' 11" (1.803 m)   Wt (!) 175.1 kg (386 lb)   SpO2 98%   Breastfeeding No   BMI 53.84 kg/m²     "

## 2024-10-01 NOTE — ANESTHESIA POSTPROCEDURE EVALUATION
Anesthesia Post Evaluation    Patient: Padmini Gaspar    Procedure(s) Performed: EGD    Final Anesthesia Type: general      Patient location during evaluation: GI PACU  Patient participation: Yes- Able to Participate  Level of consciousness: awake and alert  Post-procedure vital signs: reviewed and stable  Pain management: adequate  Airway patency: patent  BJ mitigation strategies: Multimodal analgesia  PONV status at discharge: No PONV  Anesthetic complications: no      Cardiovascular status: stable  Respiratory status: unassisted  Hydration status: euvolemic  Follow-up not needed.              Vitals Value Taken Time   /98 10/01/24 1430   Temp  10/01/24 1458   Pulse 77 10/01/24 1431   Resp 18 10/01/24 1431   SpO2 95 % 10/01/24 1431   Vitals shown include unfiled device data.      Event Time   Out of Recovery 14:46:38         Pain/Tanner Score: Tanner Score: 9 (10/1/2024  2:11 PM)

## 2024-10-01 NOTE — ANESTHESIA PREPROCEDURE EVALUATION
10/01/2024  Padmini Gaspar is a 44 y.o., female.    Past Medical History:   Diagnosis Date    Asthma     Diabetes mellitus type 2 in obese     Essential (primary) hypertension     Mixed hyperlipidemia     Moderate mitral regurgitation by prior echocardiogram     Morbid obesity with BMI of 50.0-59.9, adult     Sleep apnea        Past Surgical History:   Procedure Laterality Date     SECTION      LEFT HEART CATHETERIZATION Right 2021    Procedure: Left heart cath;  Surgeon: Isaías Darden MD;  Location: [facility] CATH LAB;  Service: Cardiology;  Laterality: Right;    TUBAL LIGATION         Family History   Problem Relation Name Age of Onset    Diabetes Mother      Heart disease Mother      Diabetes Father         Social History     Socioeconomic History    Marital status: Single   Tobacco Use    Smoking status: Light Smoker     Current packs/day: 0.25     Types: Cigarettes    Smokeless tobacco: Never   Substance and Sexual Activity    Alcohol use: Not Currently    Drug use: Not Currently    Sexual activity: Yes     Partners: Male     Social Drivers of Health     Financial Resource Strain: Low Risk  (2024)    Overall Financial Resource Strain (CARDIA)     Difficulty of Paying Living Expenses: Not hard at all   Food Insecurity: No Food Insecurity (2024)    Hunger Vital Sign     Worried About Running Out of Food in the Last Year: Never true     Ran Out of Food in the Last Year: Never true   Transportation Needs: No Transportation Needs (2024)    TRANSPORTATION NEEDS     Transportation : No   Physical Activity: Inactive (2024)    Exercise Vital Sign     Days of Exercise per Week: 0 days     Minutes of Exercise per Session: 0 min   Stress: No Stress Concern Present (2024)    [employer] - Occupational Stress Questionnaire     Feeling of Stress :  Not at all   Housing Stability: Low Risk  (7/26/2024)    Housing Stability Vital Sign     Unable to Pay for Housing in the Last Year: No     Homeless in the Last Year: No       Current Outpatient Medications   Medication Sig Dispense Refill    albuterol (PROVENTIL) 2.5 mg /3 mL (0.083 %) nebulizer solution Take 2.5 mg by nebulization every 4 (four) hours as needed.      albuterol (PROVENTIL/VENTOLIN HFA) 90 mcg/actuation inhaler Inhale 2 puffs into the lungs every 4 (four) hours as needed.      amLODIPine (NORVASC) 10 MG tablet Take 10 mg by mouth once daily.      aspirin (ECOTRIN) 81 MG EC tablet Take 1 tablet (81 mg total) by mouth once daily. 90 tablet 1    atorvastatin (LIPITOR) 40 MG tablet Take 1 tablet (40 mg total) by mouth once daily. 90 tablet 1    blood sugar diagnostic (TRUE METRIX GLUCOSE TEST STRIP) Strp 1 each by skin prick route once daily. 50 each 6    blood-glucose meter (TRUE METRIX AIR GLUCOSE METER MISC)       carvediloL (COREG) 25 MG tablet Take 25 mg by mouth 2 (two) times daily.      dapagliflozin propanediol (FARXIGA) 10 mg tablet Take 1 tablet by mouth once daily.      ferrous fumarate-folic acid 324 mg, 106mg iron,-1mg (HEMOCYTE-F) Tab Take 1 tablet by mouth 2 (two) times daily with meals.      fluticasone furoate-vilanteroL (BREO) 100-25 mcg/dose diskus inhaler Inhale 1 puff into the lungs once daily.      furosemide (LASIX) 40 MG tablet Take 40 mg by mouth every morning.      hydrALAZINE (APRESOLINE) 50 MG tablet Take 1 tablet (50 mg total) by mouth 3 (three) times daily. 270 tablet 3    HYDROcodone-acetaminophen (NORCO) 7.5-325 mg per tablet Take 1 tablet by mouth daily as needed.      ipratropium (ATROVENT) 21 mcg (0.03 %) nasal spray 2 sprays by Each Nostril route 2 (two) times daily as needed for Rhinitis. 30 mL 0    lancets (TRUEPLUS LANCETS) 28 gauge Misc 1 lancet  by skin prick route once daily. 100 each 3    metFORMIN (GLUCOPHAGE) 500 MG tablet Take 500 mg by mouth 2 (two) times  daily with meals.      olmesartan-hydrochlorothiazide (BENICAR HCT) 40-25 mg per tablet Take 1 tablet by mouth once daily.      omeprazole (PRILOSEC) 40 MG capsule Take 1 capsule (40 mg total) by mouth every morning. 30 capsule 2    rosuvastatin (CRESTOR) 10 MG tablet Take 10 mg by mouth every evening.       No current facility-administered medications for this encounter.       Review of patient's allergies indicates:  No Known Allergies     Pre-op Assessment    I have reviewed the Patient Summary Reports.     I have reviewed the Nursing Notes. I have reviewed the NPO Status.   I have reviewed the Medications.     Review of Systems  Anesthesia Hx:  No problems with previous Anesthesia                Cardiovascular:     Hypertension  Past MI                                         Pulmonary:    Asthma  Shortness of breath  Sleep Apnea           Education provided regarding risk of obstructive sleep apnea            Endocrine:  Diabetes               Physical Exam    Airway:  Mallampati: III   Mouth Opening: Normal  TM Distance: Normal  Tongue: Normal  Neck ROM: Normal ROM        Anesthesia Plan  Type of Anesthesia, risks & benefits discussed:    Anesthesia Type: Gen Natural Airway, MAC  Intra-op Monitoring Plan: Standard ASA Monitors  Post Op Pain Control Plan: multimodal analgesia  Induction:  IV  Informed Consent: Informed consent signed with the Patient and all parties understand the risks and agree with anesthesia plan.  All questions answered. Patient consented to blood products? Yes  ASA Score: 3  Day of Surgery Review of History & Physical: H&P Update referred to the surgeon/provider.I have interviewed and examined the patient. I have reviewed the patient's H&P dated:     Ready For Surgery From Anesthesia Perspective.     .

## 2024-10-02 DIAGNOSIS — R07.89 ATYPICAL CHEST PAIN: ICD-10-CM

## 2024-10-02 DIAGNOSIS — A04.8 H. PYLORI INFECTION: Primary | ICD-10-CM

## 2024-10-02 LAB
ESTROGEN SERPL-MCNC: NORMAL PG/ML
INSULIN SERPL-ACNC: NORMAL U[IU]/ML
LAB AP GROSS DESCRIPTION: NORMAL
LAB AP LABORATORY NOTES: NORMAL
T3RU NFR SERPL: NORMAL %

## 2024-10-02 RX ORDER — OMEPRAZOLE 40 MG/1
40 CAPSULE, DELAYED RELEASE ORAL 3 TIMES DAILY
Qty: 42 CAPSULE | Refills: 0 | Status: SHIPPED | OUTPATIENT
Start: 2024-10-02 | End: 2024-10-16

## 2024-10-02 RX ORDER — AMOXICILLIN 500 MG/1
1000 TABLET, FILM COATED ORAL 3 TIMES DAILY
Qty: 84 TABLET | Refills: 0 | Status: SHIPPED | OUTPATIENT
Start: 2024-10-02 | End: 2024-10-16

## 2024-10-02 NOTE — PROGRESS NOTES
EGD  biopsies show gastritis, reflux esophagitis and H pylori infection. I sent Rx for high dose omeprazole and amoxil to her pharmacy to treat H. Pylori.   I also ordered stool H pylori in 8 weeks. She should be off of omeprazole x 2 weeks before the H pylori stool test.

## 2024-10-03 ENCOUNTER — TELEPHONE (OUTPATIENT)
Dept: GASTROENTEROLOGY | Facility: CLINIC | Age: 44
End: 2024-10-03
Payer: MEDICAID

## 2024-10-03 NOTE — TELEPHONE ENCOUNTER
Attempted to call results. Left message to call back or check portal for results and recommendations.              ----- Message from Ascencion Camacho MD sent at 10/2/2024  4:49 PM CDT -----  EGD  biopsies show gastritis, reflux esophagitis and H pylori infection. I sent Rx for high dose omeprazole and amoxil to her pharmacy to treat H. Pylori.   I also ordered stool H pylori in 8 weeks. She should be off of omeprazole x 2 weeks before the H pylori stool test.

## 2024-10-03 NOTE — TELEPHONE ENCOUNTER
Attempted to call results. Left message.            ----- Message from Ascencion Camacho MD sent at 10/2/2024  4:49 PM CDT -----  EGD  biopsies show gastritis, reflux esophagitis and H pylori infection. I sent Rx for high dose omeprazole and amoxil to her pharmacy to treat H. Pylori.   I also ordered stool H pylori in 8 weeks. She should be off of omeprazole x 2 weeks before the H pylori stool test.

## 2024-10-04 ENCOUNTER — TELEPHONE (OUTPATIENT)
Dept: GASTROENTEROLOGY | Facility: CLINIC | Age: 44
End: 2024-10-04
Payer: MEDICAID

## 2024-10-04 NOTE — TELEPHONE ENCOUNTER
Attempted to call results. No answer or voicemail noted. MD sent message through portal.            ----- Message from Ascencion Camacho MD sent at 10/2/2024  4:49 PM CDT -----  EGD  biopsies show gastritis, reflux esophagitis and H pylori infection. I sent Rx for high dose omeprazole and amoxil to her pharmacy to treat H. Pylori.   I also ordered stool H pylori in 8 weeks. She should be off of omeprazole x 2 weeks before the H pylori stool test.

## 2024-10-10 ENCOUNTER — TELEPHONE (OUTPATIENT)
Dept: GASTROENTEROLOGY | Facility: CLINIC | Age: 44
End: 2024-10-10
Payer: MEDICAID

## 2024-10-28 PROBLEM — I21.A1 TYPE 2 MYOCARDIAL INFARCTION: Status: RESOLVED | Noted: 2024-07-26 | Resolved: 2024-10-28

## 2024-10-31 ENCOUNTER — OFFICE VISIT (OUTPATIENT)
Dept: GASTROENTEROLOGY | Facility: CLINIC | Age: 44
End: 2024-10-31
Payer: MEDICAID

## 2024-10-31 VITALS
WEIGHT: 293 LBS | SYSTOLIC BLOOD PRESSURE: 184 MMHG | BODY MASS INDEX: 41.02 KG/M2 | HEIGHT: 71 IN | DIASTOLIC BLOOD PRESSURE: 123 MMHG | HEART RATE: 96 BPM | OXYGEN SATURATION: 98 %

## 2024-10-31 DIAGNOSIS — D50.9 IRON DEFICIENCY ANEMIA, UNSPECIFIED IRON DEFICIENCY ANEMIA TYPE: ICD-10-CM

## 2024-10-31 DIAGNOSIS — A04.8 H. PYLORI INFECTION: Primary | ICD-10-CM

## 2024-10-31 DIAGNOSIS — K29.60 EROSIVE GASTRITIS: ICD-10-CM

## 2024-10-31 PROCEDURE — 3044F HG A1C LEVEL LT 7.0%: CPT | Mod: CPTII,,, | Performed by: NURSE PRACTITIONER

## 2024-10-31 PROCEDURE — 3008F BODY MASS INDEX DOCD: CPT | Mod: CPTII,,, | Performed by: NURSE PRACTITIONER

## 2024-10-31 PROCEDURE — 1159F MED LIST DOCD IN RCRD: CPT | Mod: CPTII,,, | Performed by: NURSE PRACTITIONER

## 2024-10-31 PROCEDURE — 99214 OFFICE O/P EST MOD 30 MIN: CPT | Mod: S$PBB,,, | Performed by: NURSE PRACTITIONER

## 2024-10-31 PROCEDURE — 3077F SYST BP >= 140 MM HG: CPT | Mod: CPTII,,, | Performed by: NURSE PRACTITIONER

## 2024-10-31 PROCEDURE — 99215 OFFICE O/P EST HI 40 MIN: CPT | Mod: PBBFAC | Performed by: NURSE PRACTITIONER

## 2024-10-31 PROCEDURE — 3080F DIAST BP >= 90 MM HG: CPT | Mod: CPTII,,, | Performed by: NURSE PRACTITIONER

## 2024-10-31 PROCEDURE — 99999 PR PBB SHADOW E&M-EST. PATIENT-LVL V: CPT | Mod: PBBFAC,,, | Performed by: NURSE PRACTITIONER

## 2024-11-26 ENCOUNTER — HOSPITAL ENCOUNTER (OUTPATIENT)
Dept: RADIOLOGY | Facility: HOSPITAL | Age: 44
Discharge: HOME | End: 2024-11-26
Attending: SPECIALIST
Payer: MEDICAID

## 2024-11-26 VITALS — WEIGHT: 293 LBS | BODY MASS INDEX: 41.02 KG/M2 | HEIGHT: 71 IN

## 2024-11-26 DIAGNOSIS — Z12.31 SCREENING MAMMOGRAM FOR BREAST CANCER: ICD-10-CM

## 2024-11-26 PROCEDURE — 77067 SCR MAMMO BI INCL CAD: CPT | Mod: TC

## 2024-12-11 ENCOUNTER — HOSPITAL ENCOUNTER (OUTPATIENT)
Dept: RADIOLOGY | Facility: HOSPITAL | Age: 44
Discharge: HOME | End: 2024-12-11
Attending: RADIOLOGY
Payer: MEDICAID

## 2024-12-11 DIAGNOSIS — R92.8 ABNORMAL MAMMOGRAM: ICD-10-CM

## 2024-12-11 PROCEDURE — 76641 ULTRASOUND BREAST COMPLETE: CPT | Mod: TC,50

## 2025-03-04 ENCOUNTER — TELEPHONE (OUTPATIENT)
Dept: GASTROENTEROLOGY | Facility: CLINIC | Age: 45
End: 2025-03-04
Payer: MEDICAID

## 2025-06-25 NOTE — ASSESSMENT & PLAN NOTE
Lab Results   Component Value Date    CHOL 135 04/20/2024    HDL 36 (L) 04/20/2024    LDLCALC 79 04/20/2024    TRIG 101 04/20/2024   Continue home statin     Advanced care planning/counseling discussion

## (undated) DEVICE — CDS ANGIOGRAPHY PACK

## (undated) DEVICE — SET IV EXTENSION 42IN W/2 PORT CLEARLINK

## (undated) DEVICE — CATH IMPULSE 6FR FR4

## (undated) DEVICE — DEVICE RADIAL TR BAND LRG

## (undated) DEVICE — DRAPE BRACHIAL ANGIOGRAPHY TIBURON

## (undated) DEVICE — TOWEL OR STERILE BLUE 4/PK 20PK/CS

## (undated) DEVICE — SENSOR PULSE OX ADULT

## (undated) DEVICE — POSITIONER ULNAR NERVE

## (undated) DEVICE — SET IV PRIMARY ALARIS (PRIMARY)

## (undated) DEVICE — GLOVE SURGICAL PROTEXIS PI SIZE 7

## (undated) DEVICE — BAG-A-JET FLUID DISPENSER SYSTEM

## (undated) DEVICE — ISOVUE 370 100ML

## (undated) DEVICE — GUIDEWIRE EXCHANGE J TIP .035X260CM

## (undated) DEVICE — Device

## (undated) DEVICE — GLOVE SURGICAL PROTEXIS PI SZ 5.5

## (undated) DEVICE — CATH IMPULSE 6FR FL4

## (undated) DEVICE — GLIDESHEATH SLENDER INTRODUCER 6FR

## (undated) DEVICE — DRESSING IV TEGADERM 10X12CM

## (undated) DEVICE — APPLICATOR CHLORAPREP LITE ORANGE 10.5ML STERILE

## (undated) DEVICE — CATH DIAG OPTITORQUE 5FR TIGER 110CM

## (undated) DEVICE — KIT ANGIO MANIFOLD CUSTOM RFH LEFT HEART KIT

## (undated) DEVICE — STOPCOCK 3-WAY ROTATING

## (undated) DEVICE — SYSTEM BACK STOP CLOSED WASTE